# Patient Record
Sex: FEMALE | Race: WHITE | NOT HISPANIC OR LATINO | Employment: STUDENT | URBAN - METROPOLITAN AREA
[De-identification: names, ages, dates, MRNs, and addresses within clinical notes are randomized per-mention and may not be internally consistent; named-entity substitution may affect disease eponyms.]

---

## 2019-03-04 ENCOUNTER — TRANSCRIBE ORDERS (OUTPATIENT)
Dept: ADMINISTRATIVE | Facility: HOSPITAL | Age: 19
End: 2019-03-04

## 2019-03-04 DIAGNOSIS — N63.10 BREAST MASS, RIGHT: Primary | ICD-10-CM

## 2019-03-06 ENCOUNTER — HOSPITAL ENCOUNTER (OUTPATIENT)
Dept: ULTRASOUND IMAGING | Facility: CLINIC | Age: 19
Discharge: HOME | End: 2019-03-06
Payer: COMMERCIAL

## 2019-03-06 VITALS — HEIGHT: 66 IN | WEIGHT: 145 LBS | BODY MASS INDEX: 23.3 KG/M2

## 2019-03-06 DIAGNOSIS — N63.10 BREAST MASS, RIGHT: ICD-10-CM

## 2019-03-06 PROCEDURE — 76642 ULTRASOUND BREAST LIMITED: CPT

## 2019-08-12 ENCOUNTER — TRANSCRIBE ORDERS (OUTPATIENT)
Dept: ADMINISTRATIVE | Facility: HOSPITAL | Age: 19
End: 2019-08-12

## 2019-08-12 DIAGNOSIS — N63.0 LUMP OR MASS IN BREAST: Primary | ICD-10-CM

## 2019-08-27 ENCOUNTER — OFFICE VISIT (OUTPATIENT)
Dept: OBGYN CLINIC | Facility: MEDICAL CENTER | Age: 19
End: 2019-08-27
Payer: COMMERCIAL

## 2019-08-27 VITALS
DIASTOLIC BLOOD PRESSURE: 80 MMHG | SYSTOLIC BLOOD PRESSURE: 123 MMHG | HEIGHT: 66 IN | HEART RATE: 55 BPM | WEIGHT: 143 LBS | BODY MASS INDEX: 22.98 KG/M2

## 2019-08-27 DIAGNOSIS — M22.2X1 PATELLOFEMORAL PAIN SYNDROME OF RIGHT KNEE: Primary | ICD-10-CM

## 2019-08-27 PROCEDURE — 99203 OFFICE O/P NEW LOW 30 MIN: CPT | Performed by: ORTHOPAEDIC SURGERY

## 2019-08-27 NOTE — PROGRESS NOTES
Orthopaedic Surgery - Office Note  Asuncion Justice (20 y o  female)   : 2000   MRN: [id number]  Encounter Date: 2019    Chief Complaint   Patient presents with    Right Knee - Pain       Assessment / Plan  Patellofemoral pain    · Continue activities as tolerated   · Continue working with ATC at school and taping  Return if symptoms worsen or fail to improve  History of Present Illness  Asuncion Justice is a 20 y o  female who presents for initial evaluation regarding her right knee pain  She states that this began 6 years ago while running track  The knee was evaluated and x-rays were performed and she was told there was wearing of cartilage    She has been treating this since with bracing, taping and working with her athletic 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  She is able to participate in practices and games and self limits as necessary  She localizes her pain to the medial aspect of right patella  She has been using Kinesio tape which she says is the only thing that has helped  She denies any dislocation or subluxation of the patella  Review of Systems  Pertinent items are noted in HPI  All other systems were reviewed and are negative  Physical Exam  /80   Pulse 55   Ht 5' 6" (1 676 m)   Wt 64 9 kg (143 lb)   BMI 23 08 kg/m²   Cons: Appears well  No apparent distress  Psych: Alert  Oriented x3  Mood and affect normal   Eyes: PERRLA, EOMI  Resp: Normal effort  No audible wheezing or stridor  CV: Palpable pulse  No discernable arrhythmia  No LE edema  Lymph:  No palpable cervical, axillary, or inguinal lymphadenopathy  Skin: Warm  No palpable masses  No visible lesions  Neuro: Normal muscle tone  Normal and symmetric DTR's  Right Knee Exam  Alignment:  Normal knee alignment  Inspection:  No swelling  No ecchymosis  Palpation:  mild peripatellar tenderness tenderness  No effusion  ROM:  Normal knee ROM    Strength: 5/5 quadriceps and hamstrings  Stability:  No objective knee instability  Stable Varus / Valgus stress, Lachman, and Posterior drawer  Tests:  No pertinent positive or negative tests  Patella:  (+) Patellar tilt  (-) Patellar apprehension  Neurovascular:  Sensation intact in DP/SP/Gonzalez/Sa/T nerve distributions  2+ DP & PT pulses  Brisk capillary refill in all toes  Toes warm and perfused  Gait:  Normal     Studies Reviewed  No studies to review    Procedures  No procedures today  Medical, Surgical, Family, and Social History  The patient's medical history, family history, and social history, were reviewed and updated as appropriate  History reviewed  No pertinent past medical history      Past Surgical History:   Procedure Laterality Date    SKIN LESION EXCISION      non cancerous    TONSILLECTOMY         Family History   Problem Relation Age of Onset    No Known Problems Mother     No Known Problems Father     Skin cancer Paternal Grandmother        Social History     Occupational History    Occupation: Student   Tobacco Use    Smoking status: Never Smoker    Smokeless tobacco: Never Used   Substance and Sexual Activity    Alcohol use: Yes     Frequency: 2-4 times a month     Comment: Socially    Drug use: Never    Sexual activity: Not on file       Allergies   Allergen Reactions    Sulfa Antibiotics          Current Outpatient Medications:     levonorgestrel (MIRENA) 20 MCG/24HR IUD, 1 each by Intrauterine route once, Disp: , Rfl:       Miladis Kim MA    Scribe Attestation    I,:   Miladis Kim MA am acting as a scribe while in the presence of the attending physician :        I,:   Temitope Stearns MD personally performed the services described in this documentation    as scribed in my presence :

## 2019-08-27 NOTE — LETTER
August 27, 2019     Patient: Torri Martínez   YOB: 2000   Date of Visit: 8/27/2019       To Whom it May Concern:    Torri Martínez is under my professional care  She was seen in my office on 8/27/2019  She may continue with activities as tolerated  She may continue taping and working with ATC  If you have any questions or concerns, please don't hesitate to call           Sincerely,          Leonila Chris MD        CC: No Recipients

## 2019-09-10 ENCOUNTER — HOSPITAL ENCOUNTER (OUTPATIENT)
Dept: ULTRASOUND IMAGING | Facility: CLINIC | Age: 19
Discharge: HOME | End: 2019-09-10
Payer: COMMERCIAL

## 2019-09-10 VITALS — WEIGHT: 143 LBS | BODY MASS INDEX: 22.98 KG/M2 | HEIGHT: 66 IN

## 2019-09-10 DIAGNOSIS — N63.0 LUMP OR MASS IN BREAST: ICD-10-CM

## 2019-09-10 PROCEDURE — 76642 ULTRASOUND BREAST LIMITED: CPT

## 2020-02-21 ENCOUNTER — TRANSCRIBE ORDERS (OUTPATIENT)
Dept: ADMINISTRATIVE | Facility: HOSPITAL | Age: 20
End: 2020-02-21

## 2020-02-21 DIAGNOSIS — N60.01 BREAST CYST, RIGHT: Primary | ICD-10-CM

## 2020-03-10 ENCOUNTER — HOSPITAL ENCOUNTER (OUTPATIENT)
Dept: ULTRASOUND IMAGING | Facility: CLINIC | Age: 20
Discharge: HOME | End: 2020-03-10
Payer: COMMERCIAL

## 2020-03-10 VITALS — WEIGHT: 143 LBS | BODY MASS INDEX: 22.98 KG/M2 | HEIGHT: 66 IN

## 2020-03-10 DIAGNOSIS — N60.01 BREAST CYST, RIGHT: ICD-10-CM

## 2020-03-10 PROCEDURE — 76642 ULTRASOUND BREAST LIMITED: CPT

## 2020-07-15 ENCOUNTER — TELEPHONE (OUTPATIENT)
Dept: DERMATOLOGY | Facility: CLINIC | Age: 20
End: 2020-07-15

## 2020-07-15 NOTE — TELEPHONE ENCOUNTER
Pt  Called in to make an appointment as a new pat  She was told that there is a wait list and was then added

## 2022-01-13 ENCOUNTER — OFFICE VISIT (OUTPATIENT)
Dept: INTERNAL MEDICINE CLINIC | Facility: CLINIC | Age: 22
End: 2022-01-13
Payer: COMMERCIAL

## 2022-01-13 VITALS
TEMPERATURE: 98 F | OXYGEN SATURATION: 99 % | DIASTOLIC BLOOD PRESSURE: 68 MMHG | HEIGHT: 66 IN | WEIGHT: 136 LBS | RESPIRATION RATE: 16 BRPM | HEART RATE: 63 BPM | SYSTOLIC BLOOD PRESSURE: 112 MMHG | BODY MASS INDEX: 21.86 KG/M2

## 2022-01-13 DIAGNOSIS — Z12.4 SCREENING FOR CERVICAL CANCER: ICD-10-CM

## 2022-01-13 DIAGNOSIS — R92.8 ABNORMAL FINDING ON BREAST IMAGING: ICD-10-CM

## 2022-01-13 DIAGNOSIS — Z13.1 SCREENING FOR DIABETES MELLITUS: ICD-10-CM

## 2022-01-13 DIAGNOSIS — Z12.83 SCREENING EXAM FOR SKIN CANCER: ICD-10-CM

## 2022-01-13 DIAGNOSIS — Z00.00 ANNUAL PHYSICAL EXAM: Primary | ICD-10-CM

## 2022-01-13 DIAGNOSIS — Z13.220 SCREENING FOR LIPID DISORDERS: ICD-10-CM

## 2022-01-13 PROCEDURE — 1036F TOBACCO NON-USER: CPT | Performed by: NURSE PRACTITIONER

## 2022-01-13 PROCEDURE — 3725F SCREEN DEPRESSION PERFORMED: CPT | Performed by: NURSE PRACTITIONER

## 2022-01-13 PROCEDURE — 3008F BODY MASS INDEX DOCD: CPT | Performed by: NURSE PRACTITIONER

## 2022-01-13 PROCEDURE — 99385 PREV VISIT NEW AGE 18-39: CPT | Performed by: NURSE PRACTITIONER

## 2022-01-13 NOTE — ASSESSMENT & PLAN NOTE
Normal exam of healthy adult female  UTD with immunizations, though does not get flu shot because she says it makes her sick  She is referred to gyn for routine care  She is a healthy BMI, reports a good diet, regular exercise  Due for dental exam   Wears glasses

## 2022-01-13 NOTE — PATIENT INSTRUCTIONS

## 2022-01-13 NOTE — ASSESSMENT & PLAN NOTE
Order provided so that pt can get recommended 6 month f/u us of R breast   There is a benign breast lump that was previously imaged, which is being monitored

## 2022-01-13 NOTE — PROGRESS NOTES
[facility] INTERNAL MEDICINE    NAME: David Finch  AGE: 24 y o  SEX: female  : 2000     DATE: 2022     Assessment and Plan:     Problem List Items Addressed This Visit        Other    Abnormal finding on breast imaging     Order provided so that pt can get recommended 6 month f/u us of R breast   There is a benign breast lump that was previously imaged, which is being monitored         Relevant Orders    US breast right limited (diagnostic)    Annual physical exam - Primary     Normal exam of healthy adult female  UTD with immunizations, though does not get flu shot because she says it makes her sick  She is referred to gyn for routine care  She is a healthy BMI, reports a good diet, regular exercise  Due for dental exam   Wears glasses  Relevant Orders    Lipid panel    CBC and differential    Basic metabolic panel      Other Visit Diagnoses     Screening exam for skin cancer        Relevant Orders    Ambulatory Referral to Dermatology    Screening for cervical cancer        Relevant Orders    Ambulatory Referral to Gynecology    Screening for lipid disorders        Relevant Orders    Lipid panel    Screening for diabetes mellitus        Relevant Orders    Basic metabolic panel          Immunizations and preventive care screenings were discussed with patient today  Appropriate education was printed on patient's after visit summary  Counseling:  Dental Health: discussed importance of regular tooth brushing, flossing, and dental visits  Injury prevention: discussed safety/seat belts, safety helmets, smoke detectors, carbon dioxide detectors, and smoking near bedding or upholstery  · Exercise: the importance of regular exercise/physical activity was discussed  Recommend exercise 3-5 times per week for at least 30 minutes         Depression Screening and Follow-up Plan: Patient was screened for depression during today's encounter  They screened negative with a PHQ-2 score of 0  Return in about 1 year (around 1/13/2023) for Annual physical      Chief Complaint:     Chief Complaint   Patient presents with    Physical Exam      History of Present Illness:     Adult Annual Physical   Patient here for a comprehensive physical exam  The patient reports problems - ear issues  Diet and Physical Activity  · Diet/Nutrition: well balanced diet, consuming 3-5 servings of fruits/vegetables daily, adequate fiber intake and adequate whole grain intake  · Exercise: strength training exercises, 5-7 times a week on average and 30-60 minutes on average  Depression Screening  PHQ-2/9 Depression Screening    Little interest or pleasure in doing things: 0 - not at all  Feeling down, depressed, or hopeless: 0 - not at all  PHQ-2 Score: 0  PHQ-2 Interpretation: Negative depression screen       General Health  · Sleep: sleeps well and gets more than 8 hours of sleep on average  · Hearing: normal - bilateral   · Vision: no vision problems, most recent eye exam <1 year ago and wears glasses  · Dental: brushes teeth twice daily  /GYN Health  · Last menstrual period: does not get on mirena  · Contraceptive method: IUD placement  · History of STDs?: no      Review of Systems:     Review of Systems   Constitutional: Negative for activity change, appetite change, chills, diaphoresis, fatigue, fever and unexpected weight change  HENT: Positive for ear pain, postnasal drip and sore throat  Negative for congestion and hearing loss  Eyes: Negative for visual disturbance  Respiratory: Negative for cough, chest tightness and shortness of breath  Cardiovascular: Negative for chest pain, palpitations and leg swelling  Gastrointestinal: Negative for abdominal pain, constipation, diarrhea, nausea and vomiting  Genitourinary: Negative for difficulty urinating, dysuria, frequency and menstrual problem     Musculoskeletal: Negative for arthralgias and myalgias  Allergic/Immunologic: Negative for environmental allergies  Neurological: Negative for dizziness, weakness, light-headedness, numbness and headaches  Psychiatric/Behavioral: Negative for dysphoric mood and sleep disturbance  The patient is not nervous/anxious  Past Medical History:     History reviewed  No pertinent past medical history  Past Surgical History:     Past Surgical History:   Procedure Laterality Date    SKIN LESION EXCISION      non cancerous    TONSILLECTOMY        Social History:     Social History     Socioeconomic History    Marital status: Single     Spouse name: None    Number of children: None    Years of education: None    Highest education level: None   Occupational History    Occupation: Student   Tobacco Use    Smoking status: Never Smoker    Smokeless tobacco: Never Used   Vaping Use    Vaping Use: Never used   Substance and Sexual Activity    Alcohol use: Yes     Alcohol/week: 2 0 standard drinks     Types: 2 Glasses of wine per week     Comment: Socially    Drug use: Never    Sexual activity: Yes     Birth control/protection: I U D     Other Topics Concern    None   Social History Narrative    Lives with her boyfriend    Works full time,     Exercise: 6-7 days per week    Diet: balanced    Caffeine: 1 cup coffee per day     Social Determinants of Health     Financial Resource Strain: Not on file   Food Insecurity: Not on file   Transportation Needs: Not on file   Physical Activity: Not on file   Stress: Not on file   Social Connections: Not on file   Intimate Partner Violence: Not on file   Housing Stability: Not on file      Family History:     Family History   Problem Relation Age of Onset    Varicose Veins Mother     Hyperlipidemia Father     Skin cancer Father     Skin cancer Paternal Grandmother     No Known Problems Sister     No Known Problems Maternal Grandmother     No Known Problems Maternal Aunt     Skin cancer Paternal Aunt     Colon cancer Maternal Uncle     No Known Problems Brother     No Known Problems Maternal Grandfather     Cancer Paternal Grandfather       Current Medications:     Current Outpatient Medications   Medication Sig Dispense Refill    levonorgestrel (MIRENA) 20 MCG/24HR IUD       levonorgestrel (MIRENA) 20 MCG/24HR IUD 1 each by Intrauterine route once       No current facility-administered medications for this visit  Allergies: Allergies   Allergen Reactions    Sulfa Antibiotics       Physical Exam:     /68 (BP Location: Left arm, Patient Position: Sitting)   Pulse 63   Temp 98 °F (36 7 °C)   Resp 16   Ht 5' 6" (1 676 m)   Wt 61 7 kg (136 lb)   SpO2 99%   BMI 21 95 kg/m²     Physical Exam  Vitals reviewed  Constitutional:       General: She is awake  She is not in acute distress  Appearance: Normal appearance  She is well-developed, well-groomed and normal weight  She is not ill-appearing  HENT:      Head: Normocephalic  Right Ear: Hearing, tympanic membrane, ear canal and external ear normal       Left Ear: Hearing, tympanic membrane, ear canal and external ear normal       Nose: Mucosal edema present  Right Turbinates: Enlarged  Left Turbinates: Enlarged  Mouth/Throat:      Lips: Pink  Mouth: Mucous membranes are moist       Pharynx: Uvula midline  Eyes:      General: Lids are normal       Conjunctiva/sclera: Conjunctivae normal       Pupils: Pupils are equal, round, and reactive to light  Neck:      Thyroid: No thyroid mass or thyromegaly  Vascular: Normal carotid pulses  No carotid bruit  Cardiovascular:      Rate and Rhythm: Normal rate and regular rhythm  Pulses: Normal pulses  Heart sounds: Normal heart sounds, S1 normal and S2 normal  No murmur heard  Pulmonary:      Effort: Pulmonary effort is normal       Breath sounds: Normal breath sounds     Abdominal:      General: Abdomen is flat  Bowel sounds are normal       Palpations: Abdomen is soft  Tenderness: There is no abdominal tenderness  Musculoskeletal:         General: Normal range of motion  Cervical back: Full passive range of motion without pain  Right lower leg: No edema  Left lower leg: No edema  Lymphadenopathy:      Head:      Right side of head: No submental, submandibular, tonsillar, preauricular, posterior auricular or occipital adenopathy  Left side of head: No submental, submandibular, tonsillar, preauricular, posterior auricular or occipital adenopathy  Cervical: No cervical adenopathy  Skin:     General: Skin is warm and dry  Neurological:      Mental Status: She is alert and oriented to person, place, and time  Sensory: No sensory deficit  Motor: Motor function is intact  Deep Tendon Reflexes: Reflexes are normal and symmetric  Psychiatric:         Attention and Perception: Attention normal          Mood and Affect: Mood normal          Speech: Speech normal          Behavior: Behavior normal  Behavior is cooperative  Thought Content:  Thought content normal          Cognition and Memory: Cognition normal          Judgment: Judgment normal           LIANET Bonilla   [facility]

## 2022-02-09 ENCOUNTER — OFFICE VISIT (OUTPATIENT)
Dept: OBGYN CLINIC | Facility: CLINIC | Age: 22
End: 2022-02-09
Payer: COMMERCIAL

## 2022-02-09 VITALS
SYSTOLIC BLOOD PRESSURE: 130 MMHG | BODY MASS INDEX: 21.95 KG/M2 | DIASTOLIC BLOOD PRESSURE: 82 MMHG | HEIGHT: 66 IN | WEIGHT: 136.6 LBS

## 2022-02-09 DIAGNOSIS — N63.20 LEFT BREAST LUMP: ICD-10-CM

## 2022-02-09 DIAGNOSIS — Z01.419 ENCOUNTER FOR ANNUAL ROUTINE GYNECOLOGICAL EXAMINATION: Primary | ICD-10-CM

## 2022-02-09 DIAGNOSIS — Z30.432 ENCOUNTER FOR IUD REMOVAL: ICD-10-CM

## 2022-02-09 DIAGNOSIS — Z12.4 SCREENING FOR CERVICAL CANCER: ICD-10-CM

## 2022-02-09 DIAGNOSIS — Z30.011 INITIATION OF ORAL CONTRACEPTION: ICD-10-CM

## 2022-02-09 PROCEDURE — 99213 OFFICE O/P EST LOW 20 MIN: CPT | Performed by: PHYSICIAN ASSISTANT

## 2022-02-09 PROCEDURE — G0145 SCR C/V CYTO,THINLAYER,RESCR: HCPCS | Performed by: PHYSICIAN ASSISTANT

## 2022-02-09 PROCEDURE — S0610 ANNUAL GYNECOLOGICAL EXAMINA: HCPCS | Performed by: PHYSICIAN ASSISTANT

## 2022-02-09 PROCEDURE — 1036F TOBACCO NON-USER: CPT | Performed by: PHYSICIAN ASSISTANT

## 2022-02-09 PROCEDURE — 3008F BODY MASS INDEX DOCD: CPT | Performed by: PHYSICIAN ASSISTANT

## 2022-02-09 PROCEDURE — 58301 REMOVE INTRAUTERINE DEVICE: CPT | Performed by: PHYSICIAN ASSISTANT

## 2022-02-09 RX ORDER — DROSPIRENONE AND ETHINYL ESTRADIOL 0.02-3(28)
1 KIT ORAL DAILY
Qty: 90 TABLET | Refills: 3 | Status: SHIPPED | OUTPATIENT
Start: 2022-02-09 | End: 2022-04-19 | Stop reason: SDUPTHER

## 2022-02-09 NOTE — PROGRESS NOTES
Assessment   20 y o  Vicenta Dempsey presenting for annual exam      Plan   Diagnoses and all orders for this visit:    Encounter for annual routine gynecological examination  -     Liquid-based pap, screening    Left breast lump  -     US breast left limited (diagnostic); Future  Discussed finding of breast lump in left breast, which is new for patient  Pt agreeable to US for evaluation and is aware of upcoming US for routine monitoring of presumed fibroadenoma of right breast      Screening for cervical cancer  -     Ambulatory Referral to Gynecology    Encounter for IUD removal  -     Iud removal  See separate note    Initiation of oral contraception  -     drospirenone-ethinyl estradiol (ESTRELLA) 3-0 02 MG per tablet; Take 1 tablet by mouth daily  Risks and benefits of OCP use discussed in detail  The patient was assessed for relative and absolute contraindications to [address] use  She denies cigarette smoking, high blood pressure, a history of DVT, known thrombogenic mutations, migraines with aura, breast cancer, or liver disease  Warning signs for OCP use were reviewed, including abdominal pain, chest pain, severe headache, eye pain, and leg pain  She was advised that OCPs do NOT protect against STIs and a barrier method is always recommended to prevent transmission  There are some drugs (such as certain anticonvulsants and antibiotics) that may decrease the contraceptive efficacy of OCPs, and she should call our office to confirm or use a backup method of contraception if taking these drugs  We discussed that a backup method of contraception should be used for at least 7 days following any missed pills  Compliance with daily pill taking was reinforced, along with consistent timing--she agrees  Expect irregular bleeding for the first 3 months  Return in 3 months for blood pressure check & to see how she is doing with OCPs  She expressed understanding of instructions for using OCPs and all questions were answered      Back up birth control for 7 days following pill start  Pap done today  Encouraged 150 min of exercise per week  Reviewed balanced diet  Vitamin D and Calcium supplement recommended  RTO one year for yearly exam or sooner as needed  __________________________________________________________________    Subjective     Dayna Sanchez is a 20 y o  Dipti Gaurang who is sexually active, amenorrheic on IUD, and using Mirena for contraception presenting for annual exam  She is without complaint  She does not want STD testing today  IUD placed 2019 -- desires removal -- see separate note  SCREENING  Last Pap: Believes had at 20 and reports negative      GYN  Complaints: denies  Denies change in menstrual cycle, dysmenorrhea, dyspareunia, genital discharge, genital ulcers, irregular/heavy menses, pelvic pain and vulvar/vaginal symptoms  Periods amenorrheic on IUD  Reports occasional, infrequent spotting  Dysmenorrhea:none  Cyclic symptoms include none  Sexually active: Yes - single partner - male  Hx STI: denies   Hx Abnormal pap: denies  Reports pap at age 20--reported normal--cannot verify in chart  We reviewed [facility]P guidelines for Pap testing today  This low risk patient meets criteria for q 3 year testing  Gardasil: She has completed the Gardasil series  OB         Complaints: denies  Denies change in urinary stream, dysuria, hematuria, nocturia and urinary frequency/urgency    BREAST  Complaints: reports right breast lump -- being followed with yearly US -- believed to be benign   Denies: breast tenderness, dryness, nipple discharge, pruritus, skin color change, skin lesion(s) and new breast lump  Personal hx: Right breast fibroadenoma -- has f/u US   Family hx: Denies fhx of breast, uterine, ovarian, or colon cancers      GENERAL  PMH reviewed/updated and is as below  Patient does follow with a PCP  History reviewed  No pertinent past medical history      Past Surgical History: Procedure Laterality Date    SKIN LESION EXCISION      non cancerous    TONSILLECTOMY           Current Outpatient Medications:     drospirenone-ethinyl estradiol (ESTRELLA) 3-0 02 MG per tablet, Take 1 tablet by mouth daily, Disp: 90 tablet, Rfl: 3    Allergies   Allergen Reactions    Sulfa Antibiotics        Social History     Socioeconomic History    Marital status: Single     Spouse name: Not on file    Number of children: Not on file    Years of education: Not on file    Highest education level: Not on file   Occupational History    Occupation: Student   Tobacco Use    Smoking status: Never Smoker    Smokeless tobacco: Never Used   Vaping Use    Vaping Use: Never used   Substance and Sexual Activity    Alcohol use: Yes     Alcohol/week: 2 0 standard drinks     Types: 2 Glasses of wine per week     Comment: Socially    Drug use: Never    Sexual activity: Yes     Partners: Male     Birth control/protection: I U D  Other Topics Concern    Not on file   Social History Narrative    Lives with her boyfriend    Works full time,     Exercise: 6-7 days per week    Diet: balanced    Caffeine: 1 cup coffee per day     Social Determinants of Health     Financial Resource Strain: Not on file   Food Insecurity: Not on file   Transportation Needs: Not on file   Physical Activity: Not on file   Stress: Not on file   Social Connections: Not on file   Intimate Partner Violence: Not on file   Housing Stability: Not on file       Review of Systems     ROS:  Constitutional: Negative for appetite change, fatigue and unexpected weight change  Respiratory: Negative for cough, wheezing, shortness of breath  Cardiovascular: Negative for chest pain, palpitations, and leg swelling  Gastrointestinal: Negative for abdominal pain and change in bowel habits/constipation/diarrhea  Breasts: right breast fibroadenoma  Negative for tenderness, pain or masses    Genitourinary: Negative for difficulty urinating, pelvic pain, vaginal bleeding, vaginal discharge, itching or odor  Psychiatric: Negative for mood difficulties  Objective      /82   Ht 5' 6" (1 676 m)   Wt 62 kg (136 lb 9 6 oz)   BMI 22 05 kg/m²     Physical Examination:    Patient appears well and is not in distress  Neck is supple without masses  Breasts are symmetrical  There is a well circumscribed mobile mass of the right breast at 3 oclock consistent with reported and documented hx  There is a similar mass, fingertip diameter of the left breast between 2-3 oclcok  Also well circumscribed, mobile, and nontender  Pt does not recall this  No other masses appreciaated on exam  Breasts are otherwise symmetrical without tenderness, nipple discharge, skin changes or adenopathy  Abdomen is soft and nontender without masses  External genitals are normal without lesions or rashes  Urethral meatus and urethra are normal  Bladder is normal to palpation  Vagina is normal without discharge or bleeding  Cervix is normal without discharge or lesion  IUD strings visualized  Uterus is normal, mobile, nontender without palpable mass  Adnexa are normal, nontender, without palpable mass

## 2022-02-09 NOTE — PROGRESS NOTES
Iud removal    Date/Time: 2/9/2022 9:02 AM  Performed by: Amber Kaufman PA-C  Authorized by: Amber Kaufman PA-C   Universal Protocol:  Consent: Verbal consent obtained  Risks and benefits: risks, benefits and alternatives were discussed  Consent given by: patient  Time out: Immediately prior to procedure a "time out" was called to verify the correct patient, procedure, equipment, support staff and site/side marked as required  Patient understanding: patient states understanding of the procedure being performed  Patient consent: the patient's understanding of the procedure matches consent given  Procedure consent: procedure consent matches procedure scheduled  Relevant documents: relevant documents present and verified  Test results: test results available and properly labeled  Required items: required blood products, implants, devices, and special equipment available  Patient identity confirmed: verbally with patient      Procedure:     Removed with no complications: yes    Comments: The patient presents for IUD removal  We discussed risks/benefits, SE's/AE's of the procedure  All questions were answered and consent was obtained  Allergies were confirmed and time out was performed  The patient was positioned in lithotomy and spec was inserted  The IUD strings were visualized at the os and they were grasped with ring forceps  Gentle traction was applied and the device was removed without difficulty  The device was noted to be intact and this was discarded  Hemostasis was observed and all instruments were removed  The patient tolerated well  She is aware fertility is restored upon removal      Carola CARRERO visualized and verified IUD as intact following removal        Mirna Bourne  2000      CC:  IUD removal    S:  20 y o  female here for IUD removal  Mirena placed 8/2019   The reason for her IUD removal is: has been on birth control since she was a teenager and would like to see if [facility] changed at all  She would like to try an OCP  We reviewed the risks of IUD removal including pain and irregular bleeding  Discussed that she will not truly know how periods are off contraception until she d/c all together  She would still like to proceed with removal and switch to OCP  She has previously been on vaginal ring, without complaint  Is only interested in pill today and does not wish to discuss alternatives  She would like a pill that can help with heavy periods and ideally not provoke her acne  O:   Blood pressure 130/82, height 5' 6" (1 676 m), weight 62 kg (136 lb 9 6 oz), not currently breastfeeding  Patient appears well and is in no distress  Abdomen is soft and nontender  External genitals are normal without rash or lesion  Vagina is normal without discharge  Cervix is normal without discharge or lesion  IUD strings are normal      PROCEDURE:   See above procedure note  There were no complications and the patient tolerated the procedure well  A:   IUD removal      P: IUD successfully removed  Start MERYL (see separate note)  Reviewed back up birth control x 7 days  Return in 3 months for pill check and in 1 year for annual exam, sooner as needed

## 2022-02-09 NOTE — PATIENT INSTRUCTIONS
Oral Contraception Instructions: You may start your pills today  If you miss 1 pill:  Take it as soon as you remember! You are still covered for birth control  This may mean you take 2 tablets at the same time, which is okay  If you miss 2 pills: You will take 2 pills one day, and then 2 pills the next day to get caught up  You are still covered for birth control  If you miss 3 pills: You now need to use barrier contraception (condoms) as your birth control pills will not be effective  Please start a new pack of pills today  You will likely have breakthrough bleeding  If you have any question please call the office  Remember, it may take up to 3 months for your body to adjust to a new birth control pill  Breakthrough bleeding is not uncommon  Birth Control Pills     Birth control pills  are also called oral contraceptives, or the pill  It is medicine that helps prevent pregnancy by stopping ovulation  Ovulation is when the ovaries make and release an egg cell each month  If this egg gets fertilized by sperm, pregnancy occurs  You will need to take the pill at the same time every day  Your healthcare provider will tell you when to start taking the pill  You will also be told what to do if you miss a dose  Instructions will depend on the kind of birth control pills you are taking  Different kinds of birth control pills:  Some kinds are taken for 21 days in a row, followed by 7 days of placebo (no hormones) pills  Other kinds are taken for 24 days followed by 4 days of placebos  Each kind has a certain amount of female hormones  Your provider will decide on the kind that is best for you based on your age and other health conditions  Call your local emergency number (911 in the [address] [place] [address],[address]) for any of the following:   · You have any of the following signs of a stroke:      ? Numbness or drooping on one side of your face     ? Weakness in an arm or leg    ?  Confusion or difficulty speaking    ? Dizziness, a severe headache, or vision loss    · You feel lightheaded, short of breath, and have chest pain  · You cough up blood  Seek care immediately if:   · Your arm or leg feels warm, tender, and painful  It may look swollen and red  · You have severe pain, numbness, or swelling in your arms or legs  Contact your healthcare provider if:   · You have forgotten to take a birth control pill  · You have mood changes, such as depression, since starting birth control pills  · You have nausea or are vomiting  · You have severe abdominal pain  · You missed a period and have questions or concerns about being pregnant  · You still have bleeding 4 months after taking birth control pills correctly  · You have questions or concerns about your condition or care  What may be done before you can start taking birth control pills: You need to see your healthcare provider to get a prescription  Any of the following may be done before your healthcare provider gives you a prescription:  · Your healthcare provider will ask about diseases and illnesses you have had in the past  Your provider will check your risk for blood clots, heart conditions, or stroke  Tell your provider if you had gastric bypass surgery  This surgery can affect the way your body absorbs medicines such as birth control pills  · Your provider will also check your blood pressure, and may do a breast and pelvic exam  A Pap smear may also be done during the pelvic exam  This is a test to make sure you do not have abnormal changes on your cervix  You may need other tests, such as a urine test to make sure you are not pregnant  · Your provider will ask if you take any medicines and if you smoke  Smoking increases your risk for stroke, heart attack, or a blood clot in your lungs  If you smoke, you should not take certain kinds of birth control pills      Advantages of birth control pills:  When birth control pills are used correctly, the chances of getting pregnant are very low  Birth control pills may help decrease bleeding and pain during your monthly period  They may also help prevent cancer of the uterus and ovaries  Disadvantages of birth control pills: You may have sudden changes in your mood or feelings while you take birth control pills  You may have nausea and a decreased sex drive  You may have an increased appetite and rapid weight gain  You may also have bleeding in between periods, less frequent periods, vaginal dryness, and breast pain  Birth control pills will not protect you from sexually transmitted infections  Rarely, some birth control pills can increase your risk for a blood clot  This may become life-threatening  If you decide you want to get pregnant: If you are planning to have a baby, ask your healthcare provider when you may stop taking your birth control pills  It may take some time for you to start ovulating again  Ask your healthcare provider for more information about pregnancy after birth control pills  When to start taking birth control pills after you have a baby: If you are not breastfeeding, you may start taking birth control pills 3 weeks after you give birth  You may be able to take certain types of birth control pills if you are breastfeeding  These pills can be started from 6 weeks to 6 months after you give birth  Ask your healthcare provider for more information about when to start taking birth control pills after you give birth  What you need to know about birth control pills and menopause:   · Talk with your healthcare provider if you want to take birth control pills around menopause  · Around age 39, you will enter into perimenopause  This means your hormone levels are dropping and you are ovulating less often  You can still become pregnant during this time  The risk for problems, such as miscarriage, are higher if you become pregnant after age 39   Birth control pills will prevent pregnancy, and may also help prevent or relieve some signs and symptoms of menopause  Examples are hot flashes and mood swings  · Your provider will do tests when you are around age 48  The tests may show that you are in menopause  If the tests do not show menopause for sure, you may be able to continue taking the pill up to age 54  The decision will depend on your health and if you have any medical conditions, such as a blood clot  Do not smoke:  Nicotine and other chemicals in cigarettes and cigars increase your risk for a blood clot while you use birth control pills  The risk is higher if you are also 35 or older  Ask your healthcare provider for information if you currently smoke and need help to quit  E-cigarettes or smokeless tobacco still contain nicotine  Talk to your healthcare provider before you use these products  Follow up with your healthcare provider as directed:  Write down your questions so you remember to ask them during your visits  © Copyright [address] Information is for End User's use only and may not be sold, redistributed or otherwise used for commercial purposes  All illustrations and images included in CareNotes® are the copyrighted property of A D A M , Inc  or [facility]   The above information is an  only  It is not intended as medical advice for individual conditions or treatments  Talk to your doctor, nurse or pharmacist before following any medical regimen to see if it is safe and effective for you

## 2022-02-09 NOTE — PROGRESS NOTES
Patient presenting as a new patient yearly  Patient has not had a pap  Patient also wanting to have Mirena IUD taken out and discuss other birth control options  She has not had a period

## 2022-02-15 LAB
LAB AP GYN PRIMARY INTERPRETATION: NORMAL
Lab: NORMAL

## 2022-02-16 ENCOUNTER — HOSPITAL ENCOUNTER (OUTPATIENT)
Dept: ULTRASOUND IMAGING | Facility: CLINIC | Age: 22
Discharge: HOME | End: 2022-02-16
Payer: COMMERCIAL

## 2022-02-16 ENCOUNTER — HOSPITAL ENCOUNTER (OUTPATIENT)
Dept: MAMMOGRAPHY | Facility: CLINIC | Age: 22
Discharge: HOME | End: 2022-02-16
Payer: COMMERCIAL

## 2022-02-16 VITALS — BODY MASS INDEX: 21.86 KG/M2 | WEIGHT: 136 LBS | HEIGHT: 66 IN

## 2022-02-16 DIAGNOSIS — R92.8 ABNORMAL FINDING ON BREAST IMAGING: ICD-10-CM

## 2022-02-16 DIAGNOSIS — N63.20 LEFT BREAST LUMP: ICD-10-CM

## 2022-02-16 PROCEDURE — 76642 ULTRASOUND BREAST LIMITED: CPT

## 2022-04-19 DIAGNOSIS — Z30.011 INITIATION OF ORAL CONTRACEPTION: ICD-10-CM

## 2022-04-19 RX ORDER — DROSPIRENONE AND ETHINYL ESTRADIOL 0.02-3(28)
1 KIT ORAL DAILY
Qty: 90 TABLET | Refills: 3 | Status: SHIPPED | OUTPATIENT
Start: 2022-04-19

## 2022-05-09 ENCOUNTER — OFFICE VISIT (OUTPATIENT)
Dept: OBGYN CLINIC | Facility: CLINIC | Age: 22
End: 2022-05-09
Payer: COMMERCIAL

## 2022-05-09 VITALS — WEIGHT: 136.4 LBS | DIASTOLIC BLOOD PRESSURE: 84 MMHG | SYSTOLIC BLOOD PRESSURE: 116 MMHG | BODY MASS INDEX: 22.02 KG/M2

## 2022-05-09 DIAGNOSIS — Z30.41 ENCOUNTER FOR SURVEILLANCE OF CONTRACEPTIVE PILLS: Primary | ICD-10-CM

## 2022-05-09 PROCEDURE — 1036F TOBACCO NON-USER: CPT | Performed by: PHYSICIAN ASSISTANT

## 2022-05-09 PROCEDURE — 99213 OFFICE O/P EST LOW 20 MIN: CPT | Performed by: PHYSICIAN ASSISTANT

## 2022-05-09 NOTE — PATIENT INSTRUCTIONS
Continue to take your birth control daily  Take ibuprofen or aleve for cramping (see below)  Return in 3 months if symptoms persist or if unhappy with pill, or sooner as needed  Return in 9 months for your annual        Prophylactic NSAID therapy for Painful or Heavy menses     Ibuprofen 600 mg (3 tablets) every 6-8 hours OR Naproxen 500 mg every 12 hours beginning 2 days prior to onset of menses and continued through the first 2-3 days of menses to reduce pain and bleeding  Make sure you take consistently or it will not be as effective in treating symptoms  You need to both of these medications with food to decrease stomach upset and irritation to your stomach      This therapy is proven to reduce you flow and cramping by 50 %    Life style changes that have a positive effect on painful and heavy periods are as follows   Daily physical exercise    Increase fiber -- fresh fruits and vegetables in your diet    Increase daily water intake    Heating pads (do not apply directly to skin, apply over clothing or towel)   Warm Baths   Relaxation techniques, meditation, massage, yoga and mindfulness

## 2022-05-09 NOTE — PROGRESS NOTES
Patient here for follow up for birth control  She denies any complaints with her pills  LMP 5/4/22  Periods are regular and last 4 days  She said the pills she was originally on are not covered by her insurance and she has to use [facility] home delivery  They took about 3 weeks to be mailed  She used a back up method for birth control at that time

## 2022-05-09 NOTE — PROGRESS NOTES
Darryn Patton  2000      Subjective:  20 y o  female here for pill check  She has been on Estrella for the past 3 months  She had IUD removed 2/9/2022  Had been amenorrheic on this  Started OCP the day she had IUD removed  Completed first 2 pill packs with very light menses, which occurred regularly, and lasted 3-4 days  No cramping, irregular bleeding, breast tenderness, moodiness, or acne during this time  Then had snafu with insurance requiring her to switch to mail order delivery or pay full price for pill  She then had a delay in starting her third pill pack, and was off pill x 3 weeks  She then got menses, which was noted to be heavier, painful x 1 day requiring her to stay home in bed x 24 hours  Noted acne and moodiness during this period  Restarted pill pack on day 1 of that menses, which only lasted 4 days  She is currently completing third pill pack  Current Outpatient Medications:     drospirenone-ethinyl estradiol (ESTRELLA) 3-0 02 MG per tablet, Take 1 tablet by mouth daily, Disp: 90 tablet, Rfl: 3  Social History     Socioeconomic History    Marital status: Single     Spouse name: Not on file    Number of children: Not on file    Years of education: Not on file    Highest education level: Not on file   Occupational History    Occupation: Student   Tobacco Use    Smoking status: Never Smoker    Smokeless tobacco: Never Used   Vaping Use    Vaping Use: Never used   Substance and Sexual Activity    Alcohol use:  Yes     Alcohol/week: 2 0 standard drinks     Types: 2 Glasses of wine per week     Comment: Socially    Drug use: Never    Sexual activity: Yes     Partners: Male     Birth control/protection: Pill   Other Topics Concern    Not on file   Social History Narrative    Lives with her boyfriend    Works full time,     Exercise: 6-7 days per week    Diet: balanced    Caffeine: 1 cup coffee per day     Social Determinants of Health     Financial Resource Strain: Not on file   Food Insecurity: Not on file   Transportation Needs: Not on file   Physical Activity: Not on file   Stress: Not on file   Social Connections: Not on file   Intimate Partner Violence: Not on file   Housing Stability: Not on file     Family History   Problem Relation Age of Onset    Varicose Veins Mother     Hyperlipidemia Father     Skin cancer Father     Skin cancer Paternal Grandmother     No Known Problems Sister     No Known Problems Maternal Grandmother     No Known Problems Maternal Aunt     Skin cancer Paternal Aunt     Colon cancer Maternal Uncle     No Known Problems Brother     No Known Problems Maternal Grandfather     Cancer Paternal Grandfather     Breast cancer Neg Hx     Ovarian cancer Neg Hx     Uterine cancer Neg Hx     Cervical cancer Neg Hx      No past medical history on file  Review of Systems   Constitutional: Negative for fever, chills, sweats, fatigue, unexpected weight change  Eyes: Negative for visual changes  Respiratory: Negative for shortness of breath and cough   Cardiovascular:  Negative for chest pain, palpitations, swelling or calf pain   Gastrointestinal: Negative for abdominal pain or change in bowel habits  Genitourinary: Negative for difficulty urinating, pelvic pain, vaginal bleeding, vaginal discharge, itching or odor  Neurological:  Negative for headaches  Objective    Blood pressure 116/84, weight 61 9 kg (136 lb 6 4 oz), last menstrual period 05/04/2022, not currently breastfeeding  Physical Exam   Constitutional: She appears well-developed and well-nourished  No acute distress  Head: Normocephalic atruamatic  Abdominal: Soft, nontender, nondistended  Psychiatric: Normal affect/behavior   Neurological: Alert and oriented  No focal deficits  Assessment:  1  Contraceptive management    Plan:    Happy with current OCP  Denies ACHES, breakthrough bleeding, nausea or other side effects  Desires to continue  · Reviewed she may have side effects and irregular bleeding during that 3 month initiation phase since she was off her pill x 3 weeks between pill packs  These side effects should alleviate after 3 months  · Requested she notify office with any bothersome side effects  · Reviewed back up birth control / condoms  Aware condoms are recommended for STD prevention  · Agreeable to plan  All questions answered  RTO in 9 months for annual exam or prn problems or concerns

## 2022-07-27 ENCOUNTER — CLINICAL SUPPORT (OUTPATIENT)
Dept: FAMILY MEDICINE CLINIC | Facility: CLINIC | Age: 22
End: 2022-07-27
Payer: COMMERCIAL

## 2022-07-27 DIAGNOSIS — Z11.1 ENCOUNTER FOR PPD TEST: Primary | ICD-10-CM

## 2022-07-27 PROCEDURE — 86580 TB INTRADERMAL TEST: CPT

## 2022-07-27 NOTE — PROGRESS NOTES
Assessment/Plan:    No problem-specific Assessment & Plan notes found for this encounter  Diagnoses and all orders for this visit:    Encounter for PPD test          Subjective:      Patient ID: Taisha Hyatt is a 20 y o  female  HPI        Review of Systems      Objective: There were no vitals taken for this visit           Physical Exam

## 2022-07-29 ENCOUNTER — CLINICAL SUPPORT (OUTPATIENT)
Dept: FAMILY MEDICINE CLINIC | Facility: CLINIC | Age: 22
End: 2022-07-29

## 2022-07-29 DIAGNOSIS — Z11.1 ENCOUNTER FOR PPD SKIN TEST READING: Primary | ICD-10-CM

## 2022-07-29 NOTE — PROGRESS NOTES
Assessment/Plan:    No problem-specific Assessment & Plan notes found for this encounter  There are no diagnoses linked to this encounter  Subjective:      Patient ID: Anurag Donato is a 20 y o  female  HPI        Review of Systems      Objective: There were no vitals taken for this visit           Physical Exam

## 2022-10-12 ENCOUNTER — VBI (OUTPATIENT)
Dept: ADMINISTRATIVE | Facility: OTHER | Age: 22
End: 2022-10-12

## 2022-10-25 ENCOUNTER — OFFICE VISIT (OUTPATIENT)
Dept: FAMILY MEDICINE CLINIC | Facility: CLINIC | Age: 22
End: 2022-10-25
Payer: COMMERCIAL

## 2022-10-25 VITALS
DIASTOLIC BLOOD PRESSURE: 76 MMHG | HEART RATE: 68 BPM | OXYGEN SATURATION: 96 % | SYSTOLIC BLOOD PRESSURE: 118 MMHG | RESPIRATION RATE: 12 BRPM | TEMPERATURE: 96 F | BODY MASS INDEX: 21.89 KG/M2 | WEIGHT: 136.2 LBS | HEIGHT: 66 IN

## 2022-10-25 DIAGNOSIS — R09.81 CHRONIC NASAL CONGESTION: Primary | ICD-10-CM

## 2022-10-25 PROCEDURE — 99213 OFFICE O/P EST LOW 20 MIN: CPT | Performed by: NURSE PRACTITIONER

## 2022-10-25 RX ORDER — AZELASTINE 1 MG/ML
1 SPRAY, METERED NASAL 2 TIMES DAILY
Qty: 30 ML | Refills: 0 | Status: SHIPPED | OUTPATIENT
Start: 2022-10-25

## 2022-10-25 NOTE — ASSESSMENT & PLAN NOTE
Likely 2/2 environmental allergy, past allergy testing has been negative  Does not tolerate flonase, po antihistamine not very effective  Will try azelastine bid  If not improving would have her see ENT

## 2022-12-13 DIAGNOSIS — R09.81 CHRONIC NASAL CONGESTION: Primary | ICD-10-CM

## 2022-12-13 DIAGNOSIS — J35.8 TONSIL STONE: ICD-10-CM

## 2023-02-13 ENCOUNTER — ANNUAL EXAM (OUTPATIENT)
Dept: OBGYN CLINIC | Facility: CLINIC | Age: 23
End: 2023-02-13

## 2023-02-13 VITALS
DIASTOLIC BLOOD PRESSURE: 78 MMHG | HEIGHT: 66 IN | BODY MASS INDEX: 22.05 KG/M2 | SYSTOLIC BLOOD PRESSURE: 116 MMHG | WEIGHT: 137.2 LBS

## 2023-02-13 DIAGNOSIS — Z30.011 INITIATION OF ORAL CONTRACEPTION: ICD-10-CM

## 2023-02-13 DIAGNOSIS — Z01.419 ROUTINE GYNECOLOGICAL EXAMINATION: Primary | ICD-10-CM

## 2023-02-13 RX ORDER — DROSPIRENONE AND ETHINYL ESTRADIOL 0.02-3(28)
1 KIT ORAL DAILY
Qty: 90 TABLET | Refills: 4 | Status: SHIPPED | OUTPATIENT
Start: 2023-02-13

## 2023-02-13 NOTE — PROGRESS NOTES
Assessment   24 y o  [facility] presenting for annual exam      Plan   Diagnoses and all orders for this visit:    Routine gynecological examination  Normal findings on routine exam   Encouraged 150 min of exercise per week  Reviewed balanced diet  Multivitamin encouraged   Breast awareness/SBE encouraged     Initiation of oral contraception    - drospirenone-ethinyl estradiol (ESTRELLA) 3-0 02 MG per tablet; Take 1 tablet by mouth daily  Dispense: 90 tablet; Refill: 4    Withdrawal bleeds are light and regular on current contraceptive  She is happy with this and desires to continue  Aware of symptoms to report  Refill sent to pharmacy on file  Pap due   Mammo - baseline screening due at age 35      RTO one year for yearly exam or sooner as needed  __________________________________________________________________    Carlos Hedrick is a 24 y o  [facility] presenting for annual exam  She is without complaint and does not want STD testing today  [place] is well  She is exercising regularly - enjoys weight lifting and running with her dog! SCREENING  Last Pap: 2022 NILM  Last Mammo: n/a  Last Colonoscopy: n/a      GYN  Periods - just had first period since starting Estrella last year (had IUD having removed 2022)  Had BTB and single period after being sick a few weeks prior - reassured this is common and may or may not have withdrawal bleeds going forward - she is happy with pill regardless  Dysmenorrhea:none  Cyclic symptoms include bloating and acne - not bothersome    Sexually active: Yes - single partner - male  Contraception: Estrella     Hx Abnormal pap: denies  We reviewed ASCCP guidelines for Pap testing today  Gardasil: She has completed the Gardasil series  Denies vaginal discharge, itching, odor, dyspareunia, pelvic pain and vulvar/vaginal symptoms      OB     Desires future fertility - not in near future         Complaints: denies   Denies urgency, frequency, hematuria, leakage / change in stream, difficulty urinating  BREAST  Complaints: denies   Denies: breast lump, breast tenderness, nipple discharge, skin color change, and skin lesion(s)  Personal hx: diagnostic b/l US 2/2022 - stable right fibroadenoma - left breast w/o abnormality on imaging - Overall BIRADS 2 - Benign      Pertinent Family Hx:   Family hx of breast cancer: no  Family hx of ovarian cancer: no  Family hx of colon cancer: maternal uncle      GENERAL  PMH reviewed/updated and is as below  Patient does follow with a PCP  History reviewed  No pertinent past medical history  Past Surgical History:   Procedure Laterality Date   • SKIN LESION EXCISION      non cancerous   • TONSILLECTOMY           Current Outpatient Medications:   •  azelastine (ASTELIN) 0 1 % nasal spray, 1 spray into each nostril 2 (two) times a day Use in each nostril as directed, Disp: 30 mL, Rfl: 0  •  drospirenone-ethinyl estradiol (ESTRELLA) 3-0 02 MG per tablet, Take 1 tablet by mouth daily, Disp: 90 tablet, Rfl: 4    Allergies   Allergen Reactions   • Sulfa Antibiotics        Social History     Socioeconomic History   • Marital status: Single     Spouse name: Not on file   • Number of children: Not on file   • Years of education: Not on file   • Highest education level: Not on file   Occupational History   • Occupation: Student   Tobacco Use   • Smoking status: Never   • Smokeless tobacco: Never   Vaping Use   • Vaping Use: Never used   Substance and Sexual Activity   • Alcohol use:  Yes     Alcohol/week: 2 0 standard drinks     Types: 2 Glasses of wine per week     Comment: Socially   • Drug use: Never   • Sexual activity: Yes     Partners: Male     Birth control/protection: Pill     Comment: Pill   Other Topics Concern   • Not on file   Social History Narrative    Lives with her boyfriend    Works full time,     Exercise: 6-7 days per week    Diet: balanced    Caffeine: 1 cup coffee per day Social Determinants of Health     Financial Resource Strain: Not on file   Food Insecurity: Not on file   Transportation Needs: Not on file   Physical Activity: Not on file   Stress: Not on file   Social Connections: Not on file   Intimate Partner Violence: Not on file   Housing Stability: Not on file       Review of Systems     ROS:  Constitutional: Negative for fatigue and unexpected weight change  Respiratory: Negative for cough and shortness of breath  Cardiovascular: Negative for chest pain and palpitations  Gastrointestinal: Negative for abdominal pain and change in bowel habits  Breasts:  Negative, other than as noted above  Genitourinary: Negative, other than as noted above  Psychiatric: Negative for mood difficulties  Objective      /78 (BP Location: Left arm, Patient Position: Sitting, Cuff Size: Standard)   Ht 5' 6" (1 676 m)   Wt 62 2 kg (137 lb 3 2 oz)   LMP 02/05/2023   BMI 22 14 kg/m²     Physical Examination:    Patient appears well and is not in distress  Neck is supple without masses  Breasts are symmetrical without mass, tenderness, nipple discharge, skin changes or adenopathy  Fibrocystic texture  Abdomen is soft and nontender without masses  External genitals are normal without lesions or rashes  Urethral meatus and urethra are normal  Bladder is normal to palpation  Vagina is normal without discharge or bleeding  Cervix is normal without discharge or lesion  Ectropion noted  Uterus is normal, mobile, nontender without palpable mass  Adnexa are normal, nontender, without palpable mass

## 2023-05-04 ENCOUNTER — OFFICE VISIT (OUTPATIENT)
Dept: DERMATOLOGY | Facility: CLINIC | Age: 23
End: 2023-05-04

## 2023-05-04 VITALS — TEMPERATURE: 98.2 F | BODY MASS INDEX: 20.89 KG/M2 | HEIGHT: 66 IN | WEIGHT: 130 LBS

## 2023-05-04 DIAGNOSIS — D23.9 DERMATOFIBROMA: Primary | ICD-10-CM

## 2023-05-04 DIAGNOSIS — Z78.9 NORMAL SKIN APPEARANCE: ICD-10-CM

## 2023-05-04 DIAGNOSIS — Z12.83 SCREENING FOR MALIGNANT NEOPLASM OF SKIN: ICD-10-CM

## 2023-05-04 NOTE — PROGRESS NOTES
"[facility] Note     Patient Name: Evan Palacio  Encounter Date: 5/4/2023     Have you been cared for by a [place] [address] Dermatologist in the last 3 years and, if so, which description applies to you? NO  I am considered a \"new\" patient and must complete all patient intake questions  I am FEMALE/of child-bearing potential     REVIEW OF SYSTEMS:  Have you recently had or currently have any of the following? · Recent fever or chills? No  · Any non-healing wound? No  · Are you pregnant or planning to become pregnant? No  · Are you currently or planning to be nursing or breast feeding? No   PAST MEDICAL HISTORY:  Have you personally ever had or currently have any of the following? If \"YES,\" then please provide more detail  · Skin cancer (such as Melanoma, Basal Cell Carcinoma, Squamous Cell Carcinoma? No  · Tuberculosis, HIV/AIDS, Hepatitis B or C: No  · Systemic Immunosuppression such as Diabetes, Biologic or Immunotherapy, Chemotherapy, Organ Transplantation, Bone Marrow Transplantation No  · Radiation Treatment No   FAMILY HISTORY:  Any \"first degree relatives\" (parent, brother, sister, or child) with the following?  Skin Cancer, Pancreatic or Other Cancer? YES, Father has history of skin cancer, maternal uncle had colon cancer   PATIENT EXPERIENCE:     Do you want the Dermatologist to perform a COMPLETE skin exam today including a clinical examination under the \"bra and underwear\" areas? Yes   If necessary, do we have your permission to call and leave a detailed message on your Preferred Phone number that includes your specific medical information?   Yes      Allergies   Allergen Reactions    Sulfa Antibiotics       Current Outpatient Medications:     drospirenone-ethinyl estradiol (ESTRELLA) 3-0 02 MG per tablet, Take 1 tablet by mouth daily, Disp: 90 tablet, Rfl: 4    famotidine (PEPCID) 40 MG tablet, 1 tab an hour before dinner, Disp: 30 tablet, Rfl: 5    azelastine (ASTELIN) 0 1 % " nasal spray, 1 spray into each nostril 2 (two) times a day Use in each nostril as directed, Disp: 30 mL, Rfl: 0           Whom besides the patient is providing clinical information about today's encounter?   o NO ADDITIONAL HISTORIAN (patient alone provided history)    Physical Exam and Assessment/Plan by Diagnosis:    1  DERMATOFIBROMA    Physical Exam:   Anatomic Location Affected:  Right medial calf   Morphological Description:  Tan firm round dermal nontender papule   Pertinent Positives:     g   Pertinent Negatives: No itch, pain    Additional History of Present Condition: Present for 6-12 months  Patient reports she had an ingrown hair there, prior to lesion  Assessment and Plan:  - History and physical consistent with dermatofibroma  - Educated that these lesions are generally benign but there are very rare subtypes that can spread to other parts of the body  - No evidence of eruptive dermatofibromas (not >15 lesions with acute onset)  - Educated the efforts to treat lesions with cryotherapy, shave biopsy and laser surgery are rarely completely successful  Based on a thorough discussion of this condition and the management approach to it (including a comprehensive discussion of the known risks, side effects and potential benefits of treatment), the patient (family) agrees to implement the following specific plan:   Educated patient to call clinic if lesion changes (enlarges, ulcerates)     Screening skin examination: Normal skin appearance today  Precautions against sun exposure  Return as soon as possible with any new or changing skin lesions    Scribe Attestation    I,:  Cristy Hernandez MA am acting as a scribe while in the presence of the attending physician :       I,:  J Carlos Hopkins MD personally performed the services described in this documentation    as scribed in my presence :

## 2023-05-04 NOTE — PATIENT INSTRUCTIONS
1  DERMATOFIBROMA    Additional History of Present Condition: Present for 6-12 months  Patient reports she had an ingrown hair there, prior to lesion  Assessment and Plan:  - History and physical consistent with dermatofibroma  - Educated that these lesions are generally benign but there are very rare subtypes that can spread to other parts of the body  - No evidence of eruptive dermatofibromas (not >15 lesions with acute onset)  - Educated the efforts to treat lesions with cryotherapy, shave biopsy and laser surgery are rarely completely successful    Based on a thorough discussion of this condition and the management approach to it (including a comprehensive discussion of the known risks, side effects and potential benefits of treatment), the patient (family) agrees to implement the following specific plan:  Educated patient to call clinic if lesion changes (enlarges, ulcerates)

## 2023-06-28 ENCOUNTER — OFFICE VISIT (OUTPATIENT)
Dept: FAMILY MEDICINE CLINIC | Facility: CLINIC | Age: 23
End: 2023-06-28
Payer: COMMERCIAL

## 2023-06-28 VITALS
OXYGEN SATURATION: 99 % | TEMPERATURE: 97.7 F | DIASTOLIC BLOOD PRESSURE: 68 MMHG | BODY MASS INDEX: 20.96 KG/M2 | WEIGHT: 125.8 LBS | HEIGHT: 65 IN | SYSTOLIC BLOOD PRESSURE: 102 MMHG | RESPIRATION RATE: 18 BRPM | HEART RATE: 75 BPM

## 2023-06-28 DIAGNOSIS — Z00.00 ANNUAL PHYSICAL EXAM: Primary | ICD-10-CM

## 2023-06-28 DIAGNOSIS — Z11.1 ENCOUNTER FOR PPD TEST: ICD-10-CM

## 2023-06-28 PROCEDURE — 99395 PREV VISIT EST AGE 18-39: CPT | Performed by: NURSE PRACTITIONER

## 2023-06-28 PROCEDURE — 86580 TB INTRADERMAL TEST: CPT

## 2023-06-28 RX ORDER — MULTIVITAMIN
1 TABLET ORAL DAILY
COMMUNITY

## 2023-06-28 NOTE — ASSESSMENT & PLAN NOTE
Normal exam of healthy adult female  Vaccines are up to date  Sees gyn regularly  Continue healthy diet and regular exercise  Continue with routine dental and eye exams

## 2023-06-28 NOTE — PROGRESS NOTES
[address]    NAME: Lynn Coppola  AGE: 24 y o  SEX: female  : 2000     DATE: 2023     Assessment and Plan:     Problem List Items Addressed This Visit        Other    Annual physical exam - Primary     Normal exam of healthy adult female  Vaccines are up to date  Sees gyn regularly  Continue healthy diet and regular exercise  Continue with routine dental and eye exams  Other Visit Diagnoses     Encounter for PPD test        Relevant Orders    TB Skin Test (Completed)          Immunizations and preventive care screenings were discussed with patient today  Appropriate education was printed on patient's after visit summary  Counseling:  Dental Health: discussed importance of regular tooth brushing, flossing, and dental visits  Injury prevention: discussed safety/seat belts, safety helmets, smoke detectors, carbon dioxide detectors, and smoking near bedding or upholstery  · Exercise: the importance of regular exercise/physical activity was discussed  Recommend exercise 3-5 times per week for at least 30 minutes  Depression Screening and Follow-up Plan: Patient was screened for depression during today's encounter  They screened negative with a PHQ-2 score of 0  Return in about 1 year (around 2024) for Annual physical      Chief Complaint:     Chief Complaint   Patient presents with   • Physical Exam     Patient is being seen for an annual physical        History of Present Illness:     Adult Annual Physical   Patient here for a comprehensive physical exam  The patient reports no problems  Diet and Physical Activity  · Diet/Nutrition: well balanced diet, limited junk food and consuming 3-5 servings of fruits/vegetables daily  · Exercise: strength training exercises and 5-7 times a week on average        Depression Screening  PHQ-2/9 Depression Screening    Little interest or pleasure in doing things: 0 - not at all  Feeling down, depressed, or hopeless: 0 - not at all  PHQ-2 Score: 0  PHQ-2 Interpretation: Negative depression screen       General Health  · Sleep: sleeps well and gets more than 8 hours of sleep on average  · Hearing: normal - bilateral   · Vision: no vision problems, goes for regular eye exams, most recent eye exam <1 year ago and has glasses but does not wear them regularly  · Dental: regular dental visits, brushes teeth twice daily and flosses teeth occasionally  /GYN Health  · Last menstrual period: 6/12  · Contraceptive method: oral contraceptives  · History of STDs?: no      Review of Systems:     Review of Systems   Constitutional: Negative for activity change, appetite change, chills, diaphoresis, fatigue, fever and unexpected weight change  HENT: Negative for hearing loss  Eyes: Negative for visual disturbance  Respiratory: Negative for cough, chest tightness and shortness of breath  Cardiovascular: Negative for chest pain, palpitations and leg swelling  Gastrointestinal: Negative for abdominal pain, constipation, diarrhea, nausea and vomiting  Genitourinary: Negative for difficulty urinating, dysuria, frequency and menstrual problem  Musculoskeletal: Positive for arthralgias (r knee pain, chronic)  Negative for myalgias  Allergic/Immunologic: Negative for environmental allergies  Neurological: Negative for dizziness, weakness, light-headedness, numbness and headaches (stress related or with computer use)  Psychiatric/Behavioral: Negative for dysphoric mood and sleep disturbance  The patient is not nervous/anxious  Past Medical History:     History reviewed  No pertinent past medical history     Past Surgical History:     Past Surgical History:   Procedure Laterality Date   • SKIN BIOPSY     • SKIN LESION EXCISION      non cancerous   • TONSILLECTOMY        Social History:     Social History     Socioeconomic History   • Marital status: Single     Spouse name: None   • Number of children: None   • Years of education: None   • Highest education level: None   Occupational History   • Occupation: Student   Tobacco Use   • Smoking status: Never     Passive exposure: Never   • Smokeless tobacco: Never   Vaping Use   • Vaping Use: Never used   Substance and Sexual Activity   • Alcohol use:  Yes     Alcohol/week: 2 0 standard drinks of alcohol     Types: 2 Glasses of wine per week     Comment: Socially   • Drug use: Never   • Sexual activity: Yes     Partners: Male     Birth control/protection: Pill     Comment: Pill   Other Topics Concern   • None   Social History Narrative    Lives with her boyfriend    Works full time,     Exercise: 6-7 days per week    Diet: balanced    Caffeine: 1 cup coffee per day     Social Determinants of Health     Financial Resource Strain: Not on file   Food Insecurity: Not on file   Transportation Needs: Not on file   Physical Activity: Not on file   Stress: Not on file   Social Connections: Not on file   Intimate Partner Violence: Not on file   Housing Stability: Not on file      Family History:     Family History   Problem Relation Age of Onset   • Varicose Veins Mother    • Hyperlipidemia Father    • Skin cancer Father    • No Known Problems Sister    • No Known Problems Brother    • No Known Problems Maternal Grandmother    • Testicular cancer Maternal Grandfather    • Skin cancer Paternal Grandmother    • Cancer Paternal Grandfather    • No Known Problems Maternal Aunt    • Colon cancer Maternal Uncle    • Skin cancer Paternal Aunt    • Breast cancer Neg Hx    • Ovarian cancer Neg Hx    • Uterine cancer Neg Hx    • Cervical cancer Neg Hx       Current Medications:     Current Outpatient Medications   Medication Sig Dispense Refill   • drospirenone-ethinyl estradiol (ESTRELLA) 3-0 02 MG per tablet Take 1 tablet by mouth daily 90 tablet 4   • famotidine (PEPCID) 40 MG tablet 1 tab an hour before "dinner 30 tablet 5   • Multiple Vitamin (multivitamin) tablet Take 1 tablet by mouth daily       No current facility-administered medications for this visit  Allergies: Allergies   Allergen Reactions   • Sulfa Antibiotics Hives      Physical Exam:     /68 (BP Location: Left arm, Patient Position: Sitting, Cuff Size: Adult)   Pulse 75   Temp 97 7 °F (36 5 °C) (Tympanic)   Resp 18   Ht 5' 5 28\" (1 658 m)   Wt 57 1 kg (125 lb 12 8 oz)   LMP 06/11/2023 (Approximate)   SpO2 99%   BMI 20 76 kg/m²     Physical Exam  Vitals reviewed  Constitutional:       General: She is awake  She is not in acute distress  Appearance: Normal appearance  She is well-developed, well-groomed and normal weight  She is not ill-appearing  HENT:      Head: Normocephalic  Right Ear: Hearing, tympanic membrane, ear canal and external ear normal       Left Ear: Hearing, tympanic membrane, ear canal and external ear normal       Nose: Nose normal       Mouth/Throat:      Lips: Pink  Pharynx: Oropharynx is clear  Uvula midline  Eyes:      General: Lids are normal       Conjunctiva/sclera: Conjunctivae normal       Pupils: Pupils are equal, round, and reactive to light  Neck:      Thyroid: No thyroid mass or thyromegaly  Vascular: Normal carotid pulses  No carotid bruit or JVD  Cardiovascular:      Rate and Rhythm: Normal rate and regular rhythm  Pulses: Normal pulses  Heart sounds: Normal heart sounds, S1 normal and S2 normal  No murmur heard  Pulmonary:      Effort: Pulmonary effort is normal       Breath sounds: Normal breath sounds  Abdominal:      General: Abdomen is flat  Bowel sounds are normal       Palpations: Abdomen is soft  Tenderness: There is no abdominal tenderness  Musculoskeletal:         General: Normal range of motion  Cervical back: Full passive range of motion without pain  Right lower leg: No edema  Left lower leg: No edema     Lymphadenopathy: " Head:      Right side of head: No submental, submandibular, tonsillar, preauricular, posterior auricular or occipital adenopathy  Left side of head: No submental, submandibular, tonsillar, preauricular, posterior auricular or occipital adenopathy  Cervical: No cervical adenopathy  Skin:     General: Skin is warm and dry  Neurological:      Mental Status: She is alert and oriented to person, place, and time  Sensory: No sensory deficit  Motor: Motor function is intact  Deep Tendon Reflexes: Reflexes are normal and symmetric  Psychiatric:         Attention and Perception: Attention normal          Mood and Affect: Mood normal          Speech: Speech normal          Behavior: Behavior normal  Behavior is cooperative  Thought Content:  Thought content normal          Cognition and Memory: Cognition normal          Judgment: Judgment normal           Ynes Bertrand, [facility]

## 2023-06-30 ENCOUNTER — CLINICAL SUPPORT (OUTPATIENT)
Dept: FAMILY MEDICINE CLINIC | Facility: CLINIC | Age: 23
End: 2023-06-30

## 2023-06-30 DIAGNOSIS — Z11.1 ENCOUNTER FOR PPD SKIN TEST READING: Primary | ICD-10-CM

## 2023-06-30 LAB
INDURATION: 0 MM
TB SKIN TEST: NEGATIVE

## 2023-06-30 NOTE — PROGRESS NOTES
Name: Kevin Rachel      : 2000      MRN: [id number]  Encounter Provider: Gary Holly  Encounter Date: 2023   Encounter department: [place] [address]   Encounter for PPD skin test reading           Subjective      HPI  Review of Systems    Current Outpatient Medications on File Prior to Visit   Medication Sig   • drospirenone-ethinyl estradiol (ESTRELLA) 3-0 02 MG per tablet Take 1 tablet by mouth daily   • famotidine (PEPCID) 40 MG tablet 1 tab an hour before dinner   • Multiple Vitamin (multivitamin) tablet Take 1 tablet by mouth daily       Objective     LMP 2023 (Approximate)     Gary Holly

## 2023-07-19 ENCOUNTER — AMB VIDEO VISIT (OUTPATIENT)
Dept: OTHER | Facility: HOSPITAL | Age: 23
End: 2023-07-19
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
## 2023-07-19 DIAGNOSIS — S20.462A TICK BITE OF LEFT BACK WALL OF THORAX, INITIAL ENCOUNTER: Primary | ICD-10-CM

## 2023-07-19 PROCEDURE — ECARE PR SL URGENT CARE VIRTUAL VISIT: Performed by: PHYSICIAN ASSISTANT

## 2023-07-19 RX ORDER — DOXYCYCLINE 100 MG/1
100 TABLET ORAL 2 TIMES DAILY
Qty: 28 TABLET | Refills: 0 | Status: SHIPPED | OUTPATIENT
Start: 2023-07-19 | End: 2023-08-02

## 2023-07-19 NOTE — CARE ANYWHERE EVISITS
Visit Summary for Lisa Patton - Gender: Female - Date of Birth: [id number]  Date: [id number] - Duration: 4 minutes  Patient: Lowell Patton  Provider: Daja Arora PA-C    Patient Contact Information  Address  [address]; [facility]  [phone]    Visit Topics  Rash [Added By: Self - 2023-07-19]    Triage Questions   What is your current physical address in the event of a medical emergency? Answer []  Are you allergic to any medications? Answer []  Are you now or could you be pregnant? Answer []  Do you have any immune system compromise or chronic lung   disease? Answer []  Do you have any vulnerable family members in the home (infant, pregnant, cancer, elderly)? Answer []     Conversation Transcripts  [0A][0A] [Notification] You are connected with Daja Arora PA-C, Urgent Care Specialist.[0A][Notification] [facility] is located in [place]. [0A][Notification] Selin Mercury has shared health history. Deng Patricia .[0A]    Diagnosis  Insect bite (nonvenomous) of left back wall of thorax, init    Procedures  Value: [id number] Code: CPT-4 UNLISTED E&M SERVICE    Medications Prescribed    No prescriptions ordered    Electronically signed by: Debbie Ospina(NPI [id number])

## 2023-07-19 NOTE — PATIENT INSTRUCTIONS
Lyme Disease   WHAT YOU NEED TO KNOW:   Lyme disease is a bacterial infection caused by the bite of an infected tick. DISCHARGE INSTRUCTIONS:   Call your local emergency number (911 in the [address]) if:   Your heart is beating faster than usual and you feel dizzy. You have chest pain or trouble breathing. You suddenly cannot talk or see well, or you have trouble moving an area of your body. Return to the emergency department if:   You have a headache and a stiff neck. You have trouble concentrating or thinking clearly. You have numbness or tingling in your arms or legs, or you have trouble walking. Call your doctor if:   Your rash grows or spreads to other areas of your body. You suddenly have trouble falling or staying asleep. You have new or worsening pain and swelling in your joints. You have new or worsening weakness and muscle pain. You have a new tick bite. You have questions or concerns about your condition or care. Medicines: You may need any of the following:  Antibiotics  treat a bacterial infection. Take your medicine as directed. Contact your healthcare provider if you think your medicine is not helping or if you have side effects. Tell your provider if you are allergic to any medicine. Keep a list of the medicines, vitamins, and herbs you take. Include the amounts, and when and why you take them. Bring the list or the pill bottles to follow-up visits. Carry your medicine list with you in case of an emergency. Prevent a tick bite:  Ticks live in areas covered by brush and grass. They may even be found in your lawn if you live in certain areas. Outdoor pets can carry ticks inside the house. Ticks can grab onto you or your clothes when you walk by grass or brush. If you go into areas that contain many trees, tall grasses, and underbrush, do the following:     Wear light colored pants and a long-sleeved shirt. Tuck your pants into your socks or boots.  Tuck in your shirt. Wear sleeves that fit close to the skin at your wrists and neck. This will help prevent ticks from crawling through gaps in your clothing and onto your skin. Wear a hat in areas with trees. Apply insect repellant on your skin. The insect repellant should contain DEET. Do not put insect repellant on skin that is cut, scratched, or irritated. Always use soap and water to wash the insect repellant off as soon as possible once you are indoors. Do not apply insect repellant on your child's face or hands. Spray insect repellant onto your clothes. Use permethrin spray. This spray kills ticks that crawl on your clothing. Be sure to spray the tops of your boots, bottom of pant legs, and sleeve cuffs. As soon as possible, wash and dry clothing in hot water and high heat. Check your and your child's clothing, hair, and skin for ticks. Shower within 2 hours of coming indoors. Carefully check the hairline, armpits, neck, and waist.    Decrease the risk for ticks in your yard. Ticks like to live in shady, moist areas. Halina Cabot your lawn regularly to keep the grass short. Trim the grass around birdbaths and fences. Cut branches that are overgrown and take them out of the yard. Clear out leaf piles. Sidney Parsley firewood in a dry, avinash area. Treat pets with tick control products  as directed. This will decrease your risk for a tick bite. Check your pets for ticks. Remove ticks from pets the same way as you remove them from people. Ask your pet's  about the best product to use on your pet. Remove a tick with tweezers. Wear gloves. Grasp the tick as close to your skin as possible. Pull the tick straight up and out. Do not touch the tick with your bare hands. Check to make sure you removed the whole tick, including the head. Clean the area with soap and water or rubbing alcohol. Then wash your hands with soap and water.        Follow up with your doctor as directed:  Write down your questions so you remember to ask them during your visits. © Copyright Beverly Merle 2022 Information is for End User's use only and may not be sold, redistributed or otherwise used for commercial purposes. The above information is an  only. It is not intended as medical advice for individual conditions or treatments. Talk to your doctor, nurse or pharmacist before following any medical regimen to see if it is safe and effective for you.

## 2023-07-19 NOTE — PROGRESS NOTES
Video Visit - Kennedy Bello 24 y.o. female MRN: [id number]    REQUIRED DOCUMENTATION:         1. This service was provided via [employer]. 2. Provider located at [address] [place] [address].  [address] [address] [phone]. 3. [employer] provider: Radha Duran PA-C.  4. Identify all parties in room with patient during [employer] visit:  No one else  5. After connecting through [employer], patient was identified by name and date of birth. Patient was then informed that this was a Telemedicine visit and that the exam was being conducted confidentially over secure lines. My office door was closed. No one else was in the room. Patient acknowledged consent and understanding of privacy and security of the Telemedicine visit. I informed the patient that I have reviewed their record in Epic and presented the opportunity for them to ask any questions regarding the visit today. The patient agreed to participate. HPI  Patient found a tick on her back 2 weeks ago. It was very small. She noticed today the area was a little red/bruised. Last time she had a tick bite the area did get irritated. She didn't think the tick bite her. She denies any flu like symptoms. Review of Systems   Constitutional: Negative. HENT: Negative. Respiratory: Negative. Cardiovascular: Negative. Gastrointestinal: Negative. Musculoskeletal: Negative. Skin: Positive for rash. Neurological: Negative. Psychiatric/Behavioral: Negative. Physical Exam  Constitutional:       General: She is not in acute distress. Appearance: Normal appearance. She is not ill-appearing, toxic-appearing or diaphoretic. HENT:      Head: Normocephalic and atraumatic. Pulmonary:      Effort: Pulmonary effort is normal. No respiratory distress. Skin:     Findings: Rash present. Comments: 4cm x 4 cm area of erythema on left upper back with centralized opening where tick was. No active drainage.   No erythema migrans    Neurological:      General: No focal deficit present. Mental Status: She is alert and oriented to person, place, and time. Cranial Nerves: No cranial nerve deficit. Sensory: No sensory deficit. Psychiatric:         Mood and Affect: Mood normal.         Behavior: Behavior normal.         Diagnoses and all orders for this visit:    Tick bite of left back wall of thorax, initial encounter  -     doxycycline (ADOXA) 100 MG tablet; Take 1 tablet (100 mg total) by mouth 2 (two) times a day for 14 days    will start antibiotic to cover for lyme but also possible infection  Monitor symptoms  Follow up with PCP for lyme testing  ER if worsen   Patient Instructions     Lyme Disease   WHAT YOU NEED TO KNOW:   Lyme disease is a bacterial infection caused by the bite of an infected tick. DISCHARGE INSTRUCTIONS:   Call your local emergency number (916 in the [address]) if:   • Your heart is beating faster than usual and you feel dizzy. • You have chest pain or trouble breathing. • You suddenly cannot talk or see well, or you have trouble moving an area of your body. Return to the emergency department if:   • You have a headache and a stiff neck. • You have trouble concentrating or thinking clearly. • You have numbness or tingling in your arms or legs, or you have trouble walking. Call your doctor if:   • Your rash grows or spreads to other areas of your body. • You suddenly have trouble falling or staying asleep. • You have new or worsening pain and swelling in your joints. • You have new or worsening weakness and muscle pain. • You have a new tick bite. • You have questions or concerns about your condition or care. Medicines: You may need any of the following:  • Antibiotics  treat a bacterial infection. • Take your medicine as directed. Contact your healthcare provider if you think your medicine is not helping or if you have side effects.  Tell your provider if you are allergic to any medicine. Keep a list of the medicines, vitamins, and herbs you take. Include the amounts, and when and why you take them. Bring the list or the pill bottles to follow-up visits. Carry your medicine list with you in case of an emergency. Prevent a tick bite:  Ticks live in areas covered by brush and grass. They may even be found in your lawn if you live in certain areas. Outdoor pets can carry ticks inside the house. Ticks can grab onto you or your clothes when you walk by grass or brush. If you go into areas that contain many trees, tall grasses, and underbrush, do the following:     • Wear light colored pants and a long-sleeved shirt. Tuck your pants into your socks or boots. Tuck in your shirt. Wear sleeves that fit close to the skin at your wrists and neck. This will help prevent ticks from crawling through gaps in your clothing and onto your skin. Wear a hat in areas with trees. • Apply insect repellant on your skin. The insect repellant should contain DEET. Do not put insect repellant on skin that is cut, scratched, or irritated. Always use soap and water to wash the insect repellant off as soon as possible once you are indoors. Do not apply insect repellant on your child's face or hands. • Spray insect repellant onto your clothes. Use permethrin spray. This spray kills ticks that crawl on your clothing. Be sure to spray the tops of your boots, bottom of pant legs, and sleeve cuffs. As soon as possible, wash and dry clothing in hot water and high heat. • Check your and your child's clothing, hair, and skin for ticks. Shower within 2 hours of coming indoors. Carefully check the hairline, armpits, neck, and waist.    • Decrease the risk for ticks in your yard. Ticks like to live in shady, moist areas. Lauren Litter your lawn regularly to keep the grass short. Trim the grass around birdbaths and fences. Cut branches that are overgrown and take them out of the yard. Clear out leaf piles. [facility] in a dry, avinash area. • Treat pets with tick control products  as directed. This will decrease your risk for a tick bite. Check your pets for ticks. Remove ticks from pets the same way as you remove them from people. Ask your pet's  about the best product to use on your pet. • Remove a tick with tweezers. Wear gloves. Grasp the tick as close to your skin as possible. Pull the tick straight up and out. Do not touch the tick with your bare hands. Check to make sure you removed the whole tick, including the head. Clean the area with soap and water or rubbing alcohol. Then wash your hands with soap and water. Follow up with your doctor as directed:  Write down your questions so you remember to ask them during your visits. © Copyright Franchesca Sim 2022 Information is for End User's use only and may not be sold, redistributed or otherwise used for commercial purposes. The above information is an  only. It is not intended as medical advice for individual conditions or treatments. Talk to your doctor, nurse or pharmacist before following any medical regimen to see if it is safe and effective for you.

## 2023-09-12 ENCOUNTER — AMB VIDEO VISIT (OUTPATIENT)
Dept: OTHER | Facility: HOSPITAL | Age: 23
End: 2023-09-12

## 2023-09-12 ENCOUNTER — TELEPHONE (OUTPATIENT)
Dept: OBGYN CLINIC | Facility: CLINIC | Age: 23
End: 2023-09-12

## 2023-09-12 VITALS — RESPIRATION RATE: 16 BRPM

## 2023-09-12 DIAGNOSIS — L01.00 IMPETIGO: Primary | ICD-10-CM

## 2023-09-12 PROCEDURE — ECARE PR SL URGENT CARE VIRTUAL VISIT: Performed by: NURSE PRACTITIONER

## 2023-09-12 RX ORDER — CEPHALEXIN 500 MG/1
500 CAPSULE ORAL EVERY 8 HOURS SCHEDULED
Qty: 21 CAPSULE | Refills: 0 | Status: SHIPPED | OUTPATIENT
Start: 2023-09-12 | End: 2023-09-19

## 2023-09-12 NOTE — PROGRESS NOTES
Video Visit - Bernardo Myrick 24 y.o. female MRN: [id number]    REQUIRED DOCUMENTATION:         1. This service was provided via [employer]. 2. Provider located at [address] [place] [address] Road  [phone].  3. [facility] provider: Shanell Cerda, [address] [facility]. 4. Identify all parties in room with patient during [facility] visit:  patient   5. After connecting through televideo, patient was identified by name and date of birth. Patient was then informed that this was a Telemedicine visit and that the exam was being conducted confidentially over secure lines. My office door was closed. No one else was in the room. Patient acknowledged consent and understanding of privacy and security of the Telemedicine visit. I informed the patient that I have reviewed their record in Epic and presented the opportunity for them to ask any questions regarding the visit today. The patient agreed to participate. This is a 24year old female here today for video visit. She states she started with rash about 5 days ago on her left cheek. She states she kept it covered with band aide. .  She states she now has 2 other spots on her face. 1 new spot yesterday and 1 new spot today. One of the have a yellowish scab. No fever, body aches or chills. No other spots. She does not go to gym but her fiance does. 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 Respiratory: Negative. Cardiovascular: Negative. Musculoskeletal: Negative. Skin: Positive for rash. Psychiatric/Behavioral: Negative. Vitals:    09/12/23 1451   Resp: 16     Physical Exam  Constitutional:       General: She is not in acute distress. Appearance: Normal appearance. She is not ill-appearing or toxic-appearing. HENT:      Head:     Neurological:      Mental Status: She is alert.    Psychiatric:         Mood and Affect: Mood normal.         Behavior: Behavior normal.         Thought Content: Thought content normal.         Judgment: Judgment normal.       Diagnoses and all orders for this visit:    Impetigo  -     cephalexin (KEFLEX) 500 mg capsule; Take 1 capsule (500 mg total) by mouth every 8 (eight) hours for 7 days  -     mupirocin (BACTROBAN) 2 % ointment; Apply topically 3 (three) times a day for 7 days      Patient Instructions   Will start  bactraban. If improvement over next 48 hours do not start antibiotic. If no improvement, start antibiotic. Take probiotic. Follow up with PCP if rash is not getting better. GO to ER with any worsening symptom. Follow up with PCP if not improved, if symptoms are worse, go to the ER.

## 2023-09-12 NOTE — CARE ANYWHERE EVISITS
Visit Summary for Livia Abad - Gender: Female - Date of Birth: [id number]  Date: [id number] - Duration: 6 minutes  Patient: Livia Abad  Provider: Shira MARTINI    Patient Contact Information  [facility]  [address] [facility] [place]  [phone]    Visit Topics  Rash [Added By: Self - 2023-09-12]    Triage Questions   What is your current physical address in the event of a medical emergency? Answer []  Are you allergic to any medications? Answer []  Are you now or could you be pregnant? Answer []  Do you have any immune system compromise or chronic lung   disease? Answer []  Do you have any vulnerable family members in the home (infant, pregnant, cancer, elderly)? Answer []     Conversation Transcripts  [0A][0A] [Notification] You are connected with Kar Dai, Urgent Care Specialist.[0A][Notification] Livia Abad is located in [place]. [0A][Notification] Livia Abad has shared health history. Deepa Trujillo .[0A]    Diagnosis  Impetigo    Procedures  Value: [id number] Code: CPT-4 UNLISTED E&M SERVICE    Medications Prescribed    No prescriptions ordered    Electronically signed by: Kar Mei(NPI [id number])

## 2023-09-12 NOTE — TELEPHONE ENCOUNTER
Patient complaining of vulvar redness and soreness. No complaining of discharge  Or odor  Adv hc cream tid.  Appointment if no relief

## 2023-09-12 NOTE — PATIENT INSTRUCTIONS
Will start  bactraban. If improvement over next 48 hours do not start antibiotic. If no improvement, start antibiotic. Take probiotic. Follow up with PCP if rash is not getting better. GO to ER with any worsening symptom.

## 2023-09-12 NOTE — TELEPHONE ENCOUNTER
Pt thinks she has a UTI. Please call pt back to discuss. Pt req appt w/ [facility] tomorrow but no openings.

## 2023-09-15 ENCOUNTER — OFFICE VISIT (OUTPATIENT)
Dept: FAMILY MEDICINE CLINIC | Facility: CLINIC | Age: 23
End: 2023-09-15
Payer: COMMERCIAL

## 2023-09-15 VITALS
BODY MASS INDEX: 22.02 KG/M2 | DIASTOLIC BLOOD PRESSURE: 72 MMHG | WEIGHT: 132.2 LBS | HEART RATE: 64 BPM | OXYGEN SATURATION: 100 % | HEIGHT: 65 IN | TEMPERATURE: 98.4 F | SYSTOLIC BLOOD PRESSURE: 120 MMHG | RESPIRATION RATE: 14 BRPM

## 2023-09-15 DIAGNOSIS — R21 RASH OF FACE: Primary | ICD-10-CM

## 2023-09-15 PROCEDURE — 99214 OFFICE O/P EST MOD 30 MIN: CPT | Performed by: NURSE PRACTITIONER

## 2023-09-15 PROCEDURE — 87252 VIRUS INOCULATION TISSUE: CPT | Performed by: NURSE PRACTITIONER

## 2023-09-15 RX ORDER — VALACYCLOVIR HYDROCHLORIDE 1 G/1
1000 TABLET, FILM COATED ORAL 3 TIMES DAILY
Qty: 21 TABLET | Refills: 0 | Status: SHIPPED | OUTPATIENT
Start: 2023-09-15 | End: 2023-09-22

## 2023-09-15 NOTE — PROGRESS NOTES
Name: Patsy Conner      : 2000      MRN: [id number]  Encounter Provider: LIANET Euceda  Encounter Date: 9/15/2023   Encounter department: [facility]             1. Rash of face  Assessment & Plan:  Started on mupirocin by telehealth on  and she thinks that though more eruptions appeared it is overall looking better. She can continue to treat topically or consider starting cephalexin that was also prescribed. Given that she described a prodromal like stinging sensation on her cheek the day prior to any skin abnormalities will not rule out herpetic etiology. Swabbed one of the eruptions to see if lab can run a culture. Sent prescription for valacyclovir as well should her symptoms worsen on the weekend. Will follow up Monday. Orders:  -     valACYclovir (VALTREX) 1,000 mg tablet; Take 1 tablet (1,000 mg total) by mouth 3 (three) times a day for 7 days  -     Virus culture; Future  -     Virus culture         Subjective      Pt is a 23 y/o female who is seen today for evaluation of rash. She started on  with a stinging sensation on her left cheek and she small a small area of peeled skin in that area the following morning. On  she started to notice more eruptions. She had a virtual visit [facility] telehealth on  and was diagnosed with impetigo and prescribed mupirocin. She was given prescription for cephalexin to start if her symptoms got worse but she has not started that at this point. Once she started the mupirocin she said it started to itch and continued with new eruptions since then. She thinks that this morning some of the areas have shown improvement. She says her ears and her throat are bothering her, she has had a headache but thinks that may be from her menstrual cycle. 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 She denies fatigue, fever, chills.       Rash  This is a new problem. The current episode started in the past 7 days. The problem has been rapidly worsening since onset. The affected locations include the face. The rash is characterized by draining, itchiness and peeling. She was exposed to nothing. Associated symptoms include a sore throat. Pertinent negatives include no anorexia, congestion, cough, diarrhea, facial edema, fatigue, fever, joint pain, rhinorrhea, shortness of breath or vomiting. Review of Systems   Constitutional: Negative for fatigue and fever. HENT: Positive for ear discharge and sore throat. Negative for congestion and rhinorrhea. Eyes: Negative for pain. Respiratory: Negative for cough and shortness of breath. Gastrointestinal: Negative for anorexia, diarrhea and vomiting. Musculoskeletal: Negative for joint pain. Skin: Positive for rash. Current Outpatient Medications on File Prior to Visit   Medication Sig   • drospirenone-ethinyl estradiol (ESTRELLA) 3-0.02 MG per tablet Take 1 tablet by mouth daily   • famotidine (PEPCID) 40 MG tablet 1 tab an hour before dinner   • Multiple Vitamin (multivitamin) tablet Take 1 tablet by mouth daily   • mupirocin (BACTROBAN) 2 % ointment Apply topically 3 (three) times a day for 7 days   • cephalexin (KEFLEX) 500 mg capsule Take 1 capsule (500 mg total) by mouth every 8 (eight) hours for 7 days (Patient not taking: Reported on 9/15/2023)       Objective     /72 (BP Location: Left arm, Patient Position: Sitting, Cuff Size: Standard)   Pulse 64   Temp 98.4 °F (36.9 °C) (Tympanic)   Resp 14   Ht 5' 5.28" (1.658 m)   Wt 60 kg (132 lb 3.2 oz)   SpO2 100%   BMI 21.81 kg/m²     Physical Exam  Vitals reviewed. Constitutional:       General: She is awake. She is not in acute distress. Appearance: Normal appearance. She is well-developed and well-groomed. She is not ill-appearing or diaphoretic.    HENT:      Right Ear: Tympanic membrane normal.      Left Ear: Tympanic membrane normal.      Ears:        Comments: R ear with an minor skin ulceration, no drainage, bed is red and yellow and moist.  Left lower ear canal with yellow vesicles. Mouth/Throat:      Lips: Pink. Mouth: Mucous membranes are moist.      Pharynx: Posterior oropharyngeal erythema (mild erythema) present. No pharyngeal swelling. Tonsils: No tonsillar exudate. Eyes:      General: Lids are normal.      Conjunctiva/sclera: Conjunctivae normal.   Cardiovascular:      Rate and Rhythm: Normal rate. Pulmonary:      Effort: Pulmonary effort is normal. No respiratory distress. Lymphadenopathy:      Cervical: Cervical adenopathy present. Right cervical: Superficial cervical adenopathy present. Left cervical: No superficial cervical adenopathy. Skin:     General: Skin is dry. Findings: Rash present. Rash is crusting and vesicular. Comments: Rash on left cheek as photographed above in a linear distribution; lesions are papular, one ear flat and erythematous with peeled skin around the border, some crusted, one on the chin is yellow    Neurological:      Mental Status: She is alert and oriented to person, place, and time. Psychiatric:         Speech: Speech normal.         Behavior: Behavior normal. Behavior is cooperative. Thought Content: Thought content normal.         Judgment: Judgment normal.       LIANET Reid  Answers for HPI/ROS submitted by the patient on 9/14/2023  nail changes:  No

## 2023-09-15 NOTE — ASSESSMENT & PLAN NOTE
Started on mupirocin by telehealth on 9/12 and she thinks that though more eruptions appeared it is overall looking better. She can continue to treat topically or consider starting cephalexin that was also prescribed. Given that she described a prodromal like stinging sensation on her cheek the day prior to any skin abnormalities will not rule out herpetic etiology. Swabbed one of the eruptions to see if lab can run a culture. Sent prescription for valacyclovir as well should her symptoms worsen on the weekend. Will follow up Monday.

## 2024-02-14 ENCOUNTER — ANNUAL EXAM (OUTPATIENT)
Dept: OBGYN CLINIC | Facility: CLINIC | Age: 24
End: 2024-02-14
Payer: COMMERCIAL

## 2024-02-14 VITALS
HEIGHT: 66 IN | DIASTOLIC BLOOD PRESSURE: 84 MMHG | SYSTOLIC BLOOD PRESSURE: 116 MMHG | WEIGHT: 129.4 LBS | BODY MASS INDEX: 20.79 KG/M2 | HEART RATE: 76 BPM

## 2024-02-14 DIAGNOSIS — N63.14 MASS OF LOWER INNER QUADRANT OF RIGHT BREAST: ICD-10-CM

## 2024-02-14 DIAGNOSIS — Z01.419 ROUTINE GYNECOLOGICAL EXAMINATION: Primary | ICD-10-CM

## 2024-02-14 DIAGNOSIS — Z30.41 SURVEILLANCE OF CONTRACEPTIVE PILL: ICD-10-CM

## 2024-02-14 PROCEDURE — S0612 ANNUAL GYNECOLOGICAL EXAMINA: HCPCS | Performed by: PHYSICIAN ASSISTANT

## 2024-02-14 RX ORDER — DROSPIRENONE AND ETHINYL ESTRADIOL 0.02-3(28)
KIT ORAL
Qty: 112 TABLET | Refills: 4 | Status: SHIPPED | OUTPATIENT
Start: 2024-02-14

## 2024-02-14 NOTE — PROGRESS NOTES
Assessment   24 y.o.  presenting for annual exam.     Plan   Diagnoses and all orders for this visit:    Routine gynecological examination  Normal findings on routine exam.  Encouraged 150 min of exercise per week.  Reviewed balanced diet.   Multivitamin encouraged   Breast awareness/SBE encouraged     Surveillance of contraceptive pill  -     drospirenone-ethinyl estradiol (ESTRELLA) 3-0.02 MG per tablet; Take one active tablet by mouth daily. No placebos.    Will try continuous dosing to avoid estrogen withdrawal HA. If not successful would desire trial of a different pill but is opposed to different method for now. Previously had IUD and felt this heightened anxiety, and was unhappy with ring. Did not have HA with ring but used continuously.     Rx sent to pharmacy. Will call office with update on sx in the next 3-6 months.     Mass of lower inner quadrant of right breast  -     US breast right limited (diagnostic); Future    New mass of LIQ of right breast. Last had breast imaging for right breast mass in , which feels unchanged from prior. Will check imaging.      Pap - due   Mammo - due @ 39      RTO one year for yearly exam or sooner as needed.    __________________________________________________________________    Subjective     Lisa ROSE Abdi is a 24 y.o.  presenting for annual exam.     Works as an  for [employer]. Not exercising regularly. Did just get a new dog and walks this daily.     SCREENING  Last Pap: 2022 NILM  Last Mammo: n/a  Last Colonoscopy: n/a     GYN    Periods are regular every 4 weeks, lasting 3 days.  Dysmenorrhea: mild, acceptable.   Cyclic symptoms include  Headache - first to second day of placebo pills, breast tenderness, bloating.  Headaches are not acceptable and have caused her to miss work.     Sexually active: Yes - single partner - male (same as prior)  Concerns: denies pain, bleeding, dryness   Contraception: Estrella      We reviewed ASCCP guidelines for Pap testing today.  Gardasil: She has completed the Gardasil series.    Denies vaginal discharge, itching, odor, dyspareunia, pelvic pain and vulvar/vaginal symptoms    OB         Complaints: denies   Denies urgency, frequency, hematuria, leakage / change in stream, difficulty urinating.       BREAST  Complaints: denies   Denies: breast lump, breast tenderness, nipple discharge, skin color change, and skin lesion(s)    Pertinent Family Hx:   Family hx of breast cancer: no  Family hx of ovarian cancer: no  Family hx of colon cancer: maternal uncle       GENERAL  PMH reviewed/updated and is as below.     Past Medical History:   Diagnosis Date    GERD (gastroesophageal reflux disease)        Past Surgical History:   Procedure Laterality Date    SKIN BIOPSY      SKIN LESION EXCISION      non cancerous    TONSILLECTOMY           Current Outpatient Medications:     drospirenone-ethinyl estradiol (ESTRELLA) 3-0.02 MG per tablet, Take one active tablet by mouth daily. No placebos., Disp: 112 tablet, Rfl: 4    famotidine (PEPCID) 40 MG tablet, 1 tab an hour before dinner, Disp: 30 tablet, Rfl: 5    Multiple Vitamin (multivitamin) tablet, Take 1 tablet by mouth daily, Disp: , Rfl:     mupirocin (BACTROBAN) 2 % ointment, Apply topically 3 (three) times a day for 7 days, Disp: 50 g, Rfl: 0    valACYclovir (VALTREX) 1,000 mg tablet, Take 1 tablet (1,000 mg total) by mouth 3 (three) times a day for 7 days, Disp: 21 tablet, Rfl: 0    Allergies   Allergen Reactions    Sulfa Antibiotics Hives       Social History     Socioeconomic History    Marital status: Single     Spouse name: Not on file    Number of children: Not on file    Years of education: Not on file    Highest education level: Not on file   Occupational History    Occupation: Student   Tobacco Use    Smoking status: Never     Passive exposure: Never    Smokeless tobacco: Never   Vaping Use    Vaping status: Never Used   Substance  "and Sexual Activity    Alcohol use: Yes     Alcohol/week: 2.0 standard drinks of alcohol     Types: 2 Glasses of wine per week     Comment: Socially    Drug use: Never    Sexual activity: Yes     Partners: Male     Birth control/protection: Pill     Comment: Pill   Other Topics Concern    Not on file   Social History Narrative    Lives with her boyfriend    Works full time,     Exercise: 6-7 days per week    Diet: balanced    Caffeine: 1 cup coffee per day     Social Determinants of Health     Financial Resource Strain: Not on file   Food Insecurity: Not on file   Transportation Needs: Not on file   Physical Activity: Not on file   Stress: Not on file   Social Connections: Not on file   Intimate Partner Violence: Not on file   Housing Stability: Not on file       Review of Systems     Constitutional: Negative.   Respiratory: Negative.    Cardiovascular: Negative   Gastrointestinal: Negative   Breasts: As noted above.   Genitourinary: As noted above.   Psychiatric: Negative     Objective      /84 (BP Location: Left arm, Patient Position: Sitting, Cuff Size: Standard)   Pulse 76   Ht 5' 5.75\" (1.67 m)   Wt 58.7 kg (129 lb 6.4 oz)   LMP 01/31/2024   BMI 21.04 kg/m²     Physical Examination:    Patient appears well and is not in distress  Breasts - 1 cm firm, mobile mass at 3 oclcok - same as prior. New 1 cm firm mobile mass with well circumscribed borders at 5 oclock. Previously noted mass of left breast no longer palpable. Breasts otherwise symmetrical without tenderness, nipple discharge, skin changes or adenopathy.   Abdomen is soft and nontender without masses.   External genitals are normal without lesions or rashes.  Urethral meatus and urethra are normal  Bladder is normal to palpation  Vagina is normal without discharge or bleeding.   Cervix is normal without discharge or lesion.   Uterus is normal, mobile, nontender without palpable mass.  Adnexa are normal, nontender, " without palpable mass.

## 2024-03-07 ENCOUNTER — TELEPHONE (OUTPATIENT)
Age: 24
End: 2024-03-07

## 2024-03-07 DIAGNOSIS — Z30.41 SURVEILLANCE OF CONTRACEPTIVE PILL: ICD-10-CM

## 2024-03-07 RX ORDER — DROSPIRENONE AND ETHINYL ESTRADIOL 0.02-3(28)
KIT ORAL
Qty: 28 TABLET | Refills: 32 | OUTPATIENT
Start: 2024-03-07

## 2024-03-07 NOTE — TELEPHONE ENCOUNTER
Patient called requesting refill for birth control. Patient made aware medication was refilled on 02/14/24 for 112 with 4 refills to [facility] pharmacy. Patient instructed to contact the pharmacy to obtain refills of medication. Patient verbalized understanding.

## 2024-09-23 DIAGNOSIS — Z30.41 SURVEILLANCE OF CONTRACEPTIVE PILL: ICD-10-CM

## 2024-09-24 RX ORDER — DROSPIRENONE AND ETHINYL ESTRADIOL 0.02-3(28)
KIT ORAL
Qty: 112 TABLET | Refills: 0 | Status: SHIPPED | OUTPATIENT
Start: 2024-09-24

## 2024-11-07 ENCOUNTER — APPOINTMENT (OUTPATIENT)
Dept: LAB | Facility: CLINIC | Age: 24
End: 2024-11-07
Payer: COMMERCIAL

## 2024-11-07 DIAGNOSIS — J35.8 CRYPTIC TONSIL: ICD-10-CM

## 2024-11-07 DIAGNOSIS — J35.8 TONSILLOLITH: ICD-10-CM

## 2024-11-07 DIAGNOSIS — Z01.818 PRE-OP TESTING: ICD-10-CM

## 2024-11-07 LAB
ANION GAP SERPL CALCULATED.3IONS-SCNC: 8 MMOL/L (ref 4–13)
BASOPHILS # BLD AUTO: 0.04 THOUSANDS/ÂΜL (ref 0–0.1)
BASOPHILS NFR BLD AUTO: 1 % (ref 0–1)
BUN SERPL-MCNC: 11 MG/DL (ref 5–25)
CALCIUM SERPL-MCNC: 9.3 MG/DL (ref 8.4–10.2)
CHLORIDE SERPL-SCNC: 105 MMOL/L (ref 96–108)
CO2 SERPL-SCNC: 26 MMOL/L (ref 21–32)
CREAT SERPL-MCNC: 0.75 MG/DL (ref 0.6–1.3)
EOSINOPHIL # BLD AUTO: 0.08 THOUSAND/ÂΜL (ref 0–0.61)
EOSINOPHIL NFR BLD AUTO: 1 % (ref 0–6)
ERYTHROCYTE [DISTWIDTH] IN BLOOD BY AUTOMATED COUNT: 11.5 % (ref 11.6–15.1)
GFR SERPL CREATININE-BSD FRML MDRD: 111 ML/MIN/1.73SQ M
GLUCOSE P FAST SERPL-MCNC: 72 MG/DL (ref 65–99)
HCT VFR BLD AUTO: 39.2 % (ref 34.8–46.1)
HGB BLD-MCNC: 13.6 G/DL (ref 11.5–15.4)
IMM GRANULOCYTES # BLD AUTO: 0.02 THOUSAND/UL (ref 0–0.2)
IMM GRANULOCYTES NFR BLD AUTO: 0 % (ref 0–2)
LYMPHOCYTES # BLD AUTO: 2.63 THOUSANDS/ÂΜL (ref 0.6–4.47)
LYMPHOCYTES NFR BLD AUTO: 40 % (ref 14–44)
MCH RBC QN AUTO: 29.9 PG (ref 26.8–34.3)
MCHC RBC AUTO-ENTMCNC: 34.7 G/DL (ref 31.4–37.4)
MCV RBC AUTO: 86 FL (ref 82–98)
MONOCYTES # BLD AUTO: 0.52 THOUSAND/ÂΜL (ref 0.17–1.22)
MONOCYTES NFR BLD AUTO: 8 % (ref 4–12)
NEUTROPHILS # BLD AUTO: 3.31 THOUSANDS/ÂΜL (ref 1.85–7.62)
NEUTS SEG NFR BLD AUTO: 50 % (ref 43–75)
NRBC BLD AUTO-RTO: 0 /100 WBCS
PLATELET # BLD AUTO: 236 THOUSANDS/UL (ref 149–390)
PMV BLD AUTO: 9.5 FL (ref 8.9–12.7)
POTASSIUM SERPL-SCNC: 4.3 MMOL/L (ref 3.5–5.3)
RBC # BLD AUTO: 4.55 MILLION/UL (ref 3.81–5.12)
SODIUM SERPL-SCNC: 139 MMOL/L (ref 135–147)
WBC # BLD AUTO: 6.6 THOUSAND/UL (ref 4.31–10.16)

## 2024-11-07 PROCEDURE — 80048 BASIC METABOLIC PNL TOTAL CA: CPT

## 2024-11-07 PROCEDURE — 85025 COMPLETE CBC W/AUTO DIFF WBC: CPT

## 2024-11-07 PROCEDURE — 36415 COLL VENOUS BLD VENIPUNCTURE: CPT

## 2024-11-12 DIAGNOSIS — Z00.6 ENCOUNTER FOR EXAMINATION FOR NORMAL COMPARISON OR CONTROL IN CLINICAL RESEARCH PROGRAM: ICD-10-CM

## 2024-11-15 ENCOUNTER — TELEPHONE (OUTPATIENT)
Dept: FAMILY MEDICINE CLINIC | Facility: CLINIC | Age: 24
End: 2024-11-15

## 2025-01-17 DIAGNOSIS — Z30.41 SURVEILLANCE OF CONTRACEPTIVE PILL: ICD-10-CM

## 2025-01-17 RX ORDER — DROSPIRENONE AND ETHINYL ESTRADIOL 0.02-3(28)
KIT ORAL
Qty: 28 TABLET | Refills: 0 | Status: SHIPPED | OUTPATIENT
Start: 2025-01-17

## 2025-01-17 NOTE — TELEPHONE ENCOUNTER
Medication: drospirenone-ethinyl estradiol (ESTRELLA)    Dose/Frequency: 3-0.02 MG per tablet; daily - no placebos.    Quantity: 112 tablet    Pharmacy: [id number] - [address] [place], [place]    Office:   [] PCP/Provider -   [x] Speciality/Provider - LILIYA Walter-SELIN     Does the patient have enough for 3 days?   [x] Yes   [] No - Send as HP to POD    Pt is scheduled for her yearly exam with Jane on 2/20/25.

## 2025-02-12 DIAGNOSIS — Z30.41 SURVEILLANCE OF CONTRACEPTIVE PILL: ICD-10-CM

## 2025-02-12 RX ORDER — DROSPIRENONE AND ETHINYL ESTRADIOL 0.02-3(28)
KIT ORAL
Qty: 28 TABLET | Refills: 0 | Status: SHIPPED | OUTPATIENT
Start: 2025-02-12 | End: 2025-02-20 | Stop reason: SDUPTHER

## 2025-02-20 ENCOUNTER — ANNUAL EXAM (OUTPATIENT)
Dept: OBGYN CLINIC | Facility: CLINIC | Age: 25
End: 2025-02-20
Payer: COMMERCIAL

## 2025-02-20 VITALS
DIASTOLIC BLOOD PRESSURE: 70 MMHG | BODY MASS INDEX: 21.69 KG/M2 | SYSTOLIC BLOOD PRESSURE: 102 MMHG | HEIGHT: 66 IN | WEIGHT: 135 LBS

## 2025-02-20 DIAGNOSIS — Z12.4 SCREENING FOR CERVICAL CANCER: ICD-10-CM

## 2025-02-20 DIAGNOSIS — Z30.41 SURVEILLANCE OF CONTRACEPTIVE PILL: ICD-10-CM

## 2025-02-20 DIAGNOSIS — Z01.419 ENCOUNTER FOR ANNUAL ROUTINE GYNECOLOGICAL EXAMINATION: Primary | ICD-10-CM

## 2025-02-20 DIAGNOSIS — R63.5 WEIGHT GAIN: ICD-10-CM

## 2025-02-20 DIAGNOSIS — F43.9 STRESS: ICD-10-CM

## 2025-02-20 PROCEDURE — S0612 ANNUAL GYNECOLOGICAL EXAMINA: HCPCS | Performed by: PHYSICIAN ASSISTANT

## 2025-02-20 PROCEDURE — G0145 SCR C/V CYTO,THINLAYER,RESCR: HCPCS | Performed by: PHYSICIAN ASSISTANT

## 2025-02-20 RX ORDER — DROSPIRENONE AND ETHINYL ESTRADIOL 0.02-3(28)
KIT ORAL
Qty: 112 TABLET | Refills: 4 | Status: SHIPPED | OUTPATIENT
Start: 2025-02-20
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
## 2025-02-26 ENCOUNTER — RESULTS FOLLOW-UP (OUTPATIENT)
Dept: OBGYN CLINIC | Facility: CLINIC | Age: 25
End: 2025-02-26

## 2025-02-26 LAB
LAB AP GYN PRIMARY INTERPRETATION: NORMAL
Lab: NORMAL

## 2025-03-21 ENCOUNTER — APPOINTMENT (OUTPATIENT)
Dept: LAB | Facility: CLINIC | Age: 25
End: 2025-03-21
Payer: COMMERCIAL

## 2025-03-21 DIAGNOSIS — R63.5 WEIGHT GAIN: ICD-10-CM

## 2025-03-21 DIAGNOSIS — Z01.419 ENCOUNTER FOR ANNUAL ROUTINE GYNECOLOGICAL EXAMINATION: ICD-10-CM

## 2025-03-21 DIAGNOSIS — F43.9 STRESS: ICD-10-CM

## 2025-03-21 PROCEDURE — 84443 ASSAY THYROID STIM HORMONE: CPT

## 2025-03-21 PROCEDURE — 36415 COLL VENOUS BLD VENIPUNCTURE: CPT

## 2025-03-22 LAB — TSH SERPL DL<=0.05 MIU/L-ACNC: 1.19 UIU/ML (ref 0.45–4.5)

## 2025-03-26 ENCOUNTER — TELEPHONE (OUTPATIENT)
Dept: OBGYN CLINIC | Facility: CLINIC | Age: 25
End: 2025-03-26

## 2025-03-26 DIAGNOSIS — Z30.41 SURVEILLANCE OF CONTRACEPTIVE PILL: ICD-10-CM

## 2025-03-26 RX ORDER — DROSPIRENONE AND ETHINYL ESTRADIOL 0.02-3(28)
KIT ORAL
Qty: 112 TABLET | Refills: 4 | Status: SHIPPED | OUTPATIENT
Start: 2025-03-26

## 2025-03-26 NOTE — TELEPHONE ENCOUNTER
1 year supply of Fabiola sent in to [facility] 2/20/25.     Outgoing call to patient to clarify if she would like remaining refills sent to [facility]. No answer, left message for call back.